# Patient Record
Sex: MALE | Race: WHITE | Employment: FULL TIME | URBAN - METROPOLITAN AREA
[De-identification: names, ages, dates, MRNs, and addresses within clinical notes are randomized per-mention and may not be internally consistent; named-entity substitution may affect disease eponyms.]

---

## 2017-04-24 ENCOUNTER — COMMUNICATION - HEALTHEAST (OUTPATIENT)
Dept: INTERNAL MEDICINE | Facility: CLINIC | Age: 37
End: 2017-04-24

## 2017-04-26 ENCOUNTER — COMMUNICATION - HEALTHEAST (OUTPATIENT)
Dept: INTERNAL MEDICINE | Facility: CLINIC | Age: 37
End: 2017-04-26

## 2017-05-09 ENCOUNTER — RECORDS - HEALTHEAST (OUTPATIENT)
Dept: ADMINISTRATIVE | Facility: OTHER | Age: 37
End: 2017-05-09

## 2017-05-18 ENCOUNTER — COMMUNICATION - HEALTHEAST (OUTPATIENT)
Dept: INTERNAL MEDICINE | Facility: CLINIC | Age: 37
End: 2017-05-18

## 2017-06-14 ENCOUNTER — COMMUNICATION - HEALTHEAST (OUTPATIENT)
Dept: INTERNAL MEDICINE | Facility: CLINIC | Age: 37
End: 2017-06-14

## 2017-06-14 DIAGNOSIS — E11.9 DIABETES (H): ICD-10-CM

## 2017-07-10 ENCOUNTER — COMMUNICATION - HEALTHEAST (OUTPATIENT)
Dept: INTERNAL MEDICINE | Facility: CLINIC | Age: 37
End: 2017-07-10

## 2017-08-06 ENCOUNTER — COMMUNICATION - HEALTHEAST (OUTPATIENT)
Dept: INTERNAL MEDICINE | Facility: CLINIC | Age: 37
End: 2017-08-06

## 2017-09-04 ENCOUNTER — COMMUNICATION - HEALTHEAST (OUTPATIENT)
Dept: INTERNAL MEDICINE | Facility: CLINIC | Age: 37
End: 2017-09-04

## 2017-10-04 ENCOUNTER — COMMUNICATION - HEALTHEAST (OUTPATIENT)
Dept: INTERNAL MEDICINE | Facility: CLINIC | Age: 37
End: 2017-10-04

## 2017-10-30 ENCOUNTER — COMMUNICATION - HEALTHEAST (OUTPATIENT)
Dept: INTERNAL MEDICINE | Facility: CLINIC | Age: 37
End: 2017-10-30

## 2018-04-12 ENCOUNTER — COMMUNICATION - HEALTHEAST (OUTPATIENT)
Dept: INTERNAL MEDICINE | Facility: CLINIC | Age: 38
End: 2018-04-12

## 2018-04-12 DIAGNOSIS — E11.9 DIABETES (H): ICD-10-CM

## 2018-04-15 ENCOUNTER — COMMUNICATION - HEALTHEAST (OUTPATIENT)
Dept: SCHEDULING | Facility: CLINIC | Age: 38
End: 2018-04-15

## 2018-04-16 ENCOUNTER — COMMUNICATION - HEALTHEAST (OUTPATIENT)
Dept: INTERNAL MEDICINE | Facility: CLINIC | Age: 38
End: 2018-04-16

## 2018-04-16 DIAGNOSIS — E11.9 DIABETES (H): ICD-10-CM

## 2018-05-09 ENCOUNTER — OFFICE VISIT - HEALTHEAST (OUTPATIENT)
Dept: INTERNAL MEDICINE | Facility: CLINIC | Age: 38
End: 2018-05-09

## 2018-05-09 DIAGNOSIS — M81.0 OSTEOPOROSIS: ICD-10-CM

## 2018-05-09 DIAGNOSIS — F33.0 MILD EPISODE OF RECURRENT MAJOR DEPRESSIVE DISORDER (H): ICD-10-CM

## 2018-05-09 DIAGNOSIS — E10.9 TYPE 1 DIABETES MELLITUS WITHOUT COMPLICATION (H): ICD-10-CM

## 2018-05-09 LAB
ALBUMIN SERPL-MCNC: 3.9 G/DL (ref 3.5–5)
ALP SERPL-CCNC: 120 U/L (ref 45–120)
ALT SERPL W P-5'-P-CCNC: 32 U/L (ref 0–45)
ANION GAP SERPL CALCULATED.3IONS-SCNC: 10 MMOL/L (ref 5–18)
AST SERPL W P-5'-P-CCNC: 15 U/L (ref 0–40)
BILIRUB SERPL-MCNC: 0.5 MG/DL (ref 0–1)
BUN SERPL-MCNC: 13 MG/DL (ref 8–22)
CALCIUM SERPL-MCNC: 9.5 MG/DL (ref 8.5–10.5)
CHLORIDE BLD-SCNC: 104 MMOL/L (ref 98–107)
CHOLEST SERPL-MCNC: 139 MG/DL
CO2 SERPL-SCNC: 25 MMOL/L (ref 22–31)
CREAT SERPL-MCNC: 0.87 MG/DL (ref 0.7–1.3)
FASTING STATUS PATIENT QL REPORTED: YES
GFR SERPL CREATININE-BSD FRML MDRD: >60 ML/MIN/1.73M2
GLUCOSE BLD-MCNC: 278 MG/DL (ref 70–125)
HBA1C MFR BLD: 13.6 % (ref 3.5–6)
HDLC SERPL-MCNC: 38 MG/DL
LDLC SERPL CALC-MCNC: 89 MG/DL
POTASSIUM BLD-SCNC: 4.8 MMOL/L (ref 3.5–5)
PROT SERPL-MCNC: 7.3 G/DL (ref 6–8)
SODIUM SERPL-SCNC: 139 MMOL/L (ref 136–145)
TRIGL SERPL-MCNC: 58 MG/DL
TSH SERPL DL<=0.005 MIU/L-ACNC: 1.61 UIU/ML (ref 0.3–5)

## 2018-05-09 ASSESSMENT — MIFFLIN-ST. JEOR: SCORE: 1648.35

## 2018-05-10 ENCOUNTER — COMMUNICATION - HEALTHEAST (OUTPATIENT)
Dept: INTERNAL MEDICINE | Facility: CLINIC | Age: 38
End: 2018-05-10

## 2018-05-10 DIAGNOSIS — E11.9 DIABETES (H): ICD-10-CM

## 2018-05-10 LAB — 25(OH)D3 SERPL-MCNC: 15.8 NG/ML (ref 30–80)

## 2018-05-12 LAB
TESTOST SERPL-MCNC: 201 NG/DL (ref 240–950)
TESTOSTERONE, FREE, S - HISTORICAL: 5.43 NG/DL (ref 4.65–18.1)

## 2018-05-30 ENCOUNTER — COMMUNICATION - HEALTHEAST (OUTPATIENT)
Dept: INTERNAL MEDICINE | Facility: CLINIC | Age: 38
End: 2018-05-30

## 2018-06-01 ENCOUNTER — COMMUNICATION - HEALTHEAST (OUTPATIENT)
Dept: INTERNAL MEDICINE | Facility: CLINIC | Age: 38
End: 2018-06-01

## 2018-06-08 ENCOUNTER — RECORDS - HEALTHEAST (OUTPATIENT)
Dept: ADMINISTRATIVE | Facility: OTHER | Age: 38
End: 2018-06-08

## 2018-06-26 ENCOUNTER — COMMUNICATION - HEALTHEAST (OUTPATIENT)
Dept: INTERNAL MEDICINE | Facility: CLINIC | Age: 38
End: 2018-06-26

## 2018-06-26 DIAGNOSIS — E11.9 DIABETES (H): ICD-10-CM

## 2018-06-26 DIAGNOSIS — E10.9 TYPE 1 DIABETES MELLITUS (H): ICD-10-CM

## 2018-08-01 ENCOUNTER — COMMUNICATION - HEALTHEAST (OUTPATIENT)
Dept: INTERNAL MEDICINE | Facility: CLINIC | Age: 38
End: 2018-08-01

## 2018-08-16 ENCOUNTER — COMMUNICATION - HEALTHEAST (OUTPATIENT)
Dept: INTERNAL MEDICINE | Facility: CLINIC | Age: 38
End: 2018-08-16

## 2018-08-16 DIAGNOSIS — E11.9 DIABETES (H): ICD-10-CM

## 2018-08-27 ENCOUNTER — RECORDS - HEALTHEAST (OUTPATIENT)
Dept: ADMINISTRATIVE | Facility: OTHER | Age: 38
End: 2018-08-27

## 2018-08-30 ENCOUNTER — COMMUNICATION - HEALTHEAST (OUTPATIENT)
Dept: INTERNAL MEDICINE | Facility: CLINIC | Age: 38
End: 2018-08-30

## 2018-09-27 ENCOUNTER — COMMUNICATION - HEALTHEAST (OUTPATIENT)
Dept: INTERNAL MEDICINE | Facility: CLINIC | Age: 38
End: 2018-09-27

## 2018-09-27 DIAGNOSIS — E11.9 DIABETES (H): ICD-10-CM

## 2018-11-01 ENCOUNTER — COMMUNICATION - HEALTHEAST (OUTPATIENT)
Dept: INTERNAL MEDICINE | Facility: CLINIC | Age: 38
End: 2018-11-01

## 2018-11-15 ENCOUNTER — COMMUNICATION - HEALTHEAST (OUTPATIENT)
Dept: INTERNAL MEDICINE | Facility: CLINIC | Age: 38
End: 2018-11-15

## 2018-11-15 DIAGNOSIS — E11.9 DIABETES (H): ICD-10-CM

## 2018-11-28 ENCOUNTER — COMMUNICATION - HEALTHEAST (OUTPATIENT)
Dept: INTERNAL MEDICINE | Facility: CLINIC | Age: 38
End: 2018-11-28

## 2018-12-19 ENCOUNTER — COMMUNICATION - HEALTHEAST (OUTPATIENT)
Dept: INTERNAL MEDICINE | Facility: CLINIC | Age: 38
End: 2018-12-19

## 2018-12-19 DIAGNOSIS — E11.9 DIABETES (H): ICD-10-CM

## 2019-01-21 ENCOUNTER — COMMUNICATION - HEALTHEAST (OUTPATIENT)
Dept: INTERNAL MEDICINE | Facility: CLINIC | Age: 39
End: 2019-01-21

## 2019-01-23 ENCOUNTER — COMMUNICATION - HEALTHEAST (OUTPATIENT)
Dept: INTERNAL MEDICINE | Facility: CLINIC | Age: 39
End: 2019-01-23

## 2019-01-24 ENCOUNTER — COMMUNICATION - HEALTHEAST (OUTPATIENT)
Dept: INTERNAL MEDICINE | Facility: CLINIC | Age: 39
End: 2019-01-24

## 2019-01-24 DIAGNOSIS — E11.9 DIABETES (H): ICD-10-CM

## 2019-01-25 ENCOUNTER — COMMUNICATION - HEALTHEAST (OUTPATIENT)
Dept: INTERNAL MEDICINE | Facility: CLINIC | Age: 39
End: 2019-01-25

## 2019-01-25 DIAGNOSIS — E11.9 DIABETES (H): ICD-10-CM

## 2019-02-19 ENCOUNTER — COMMUNICATION - HEALTHEAST (OUTPATIENT)
Dept: INTERNAL MEDICINE | Facility: CLINIC | Age: 39
End: 2019-02-19

## 2019-02-19 DIAGNOSIS — E11.9 DIABETES (H): ICD-10-CM

## 2019-03-04 ENCOUNTER — HOSPITAL ENCOUNTER (EMERGENCY)
Facility: CLINIC | Age: 39
Discharge: HOME | End: 2019-03-04
Attending: EMERGENCY MEDICINE | Admitting: EMERGENCY MEDICINE
Payer: COMMERCIAL

## 2019-03-04 ENCOUNTER — RECORDS - HEALTHEAST (OUTPATIENT)
Dept: ADMINISTRATIVE | Facility: OTHER | Age: 39
End: 2019-03-04

## 2019-03-04 ENCOUNTER — APPOINTMENT (OUTPATIENT)
Dept: GENERAL RADIOLOGY | Facility: CLINIC | Age: 39
End: 2019-03-04
Attending: EMERGENCY MEDICINE
Payer: COMMERCIAL

## 2019-03-04 VITALS
OXYGEN SATURATION: 99 % | TEMPERATURE: 98.4 F | BODY MASS INDEX: 23.62 KG/M2 | DIASTOLIC BLOOD PRESSURE: 92 MMHG | SYSTOLIC BLOOD PRESSURE: 138 MMHG | HEART RATE: 118 BPM | WEIGHT: 165 LBS | HEIGHT: 70 IN | RESPIRATION RATE: 23 BRPM

## 2019-03-04 DIAGNOSIS — R73.9 HYPERGLYCEMIA: ICD-10-CM

## 2019-03-04 DIAGNOSIS — J10.1 INFLUENZA A: ICD-10-CM

## 2019-03-04 DIAGNOSIS — E86.0 DEHYDRATION: ICD-10-CM

## 2019-03-04 LAB
ALBUMIN SERPL-MCNC: 3.5 G/DL (ref 3.4–5)
ALBUMIN UR-MCNC: 30 MG/DL
ALP SERPL-CCNC: 109 U/L (ref 40–150)
ALT SERPL W P-5'-P-CCNC: 43 U/L (ref 0–70)
ANION GAP SERPL CALCULATED.3IONS-SCNC: 12 MMOL/L (ref 3–14)
APPEARANCE UR: CLEAR
AST SERPL W P-5'-P-CCNC: 20 U/L (ref 0–45)
BASOPHILS # BLD AUTO: 0 10E9/L (ref 0–0.2)
BASOPHILS NFR BLD AUTO: 0.1 %
BILIRUB SERPL-MCNC: 0.6 MG/DL (ref 0.2–1.3)
BILIRUB UR QL STRIP: NEGATIVE
BUN SERPL-MCNC: 40 MG/DL (ref 7–30)
CALCIUM SERPL-MCNC: 8.6 MG/DL (ref 8.5–10.1)
CHLORIDE SERPL-SCNC: 89 MMOL/L (ref 94–109)
CO2 BLDCOV-SCNC: 31 MMOL/L (ref 21–28)
CO2 SERPL-SCNC: 30 MMOL/L (ref 20–32)
COLOR UR AUTO: YELLOW
CREAT SERPL-MCNC: 0.94 MG/DL (ref 0.66–1.25)
DIFFERENTIAL METHOD BLD: ABNORMAL
EOSINOPHIL # BLD AUTO: 0 10E9/L (ref 0–0.7)
EOSINOPHIL NFR BLD AUTO: 0 %
ERYTHROCYTE [DISTWIDTH] IN BLOOD BY AUTOMATED COUNT: 12.3 % (ref 10–15)
FLUAV+FLUBV AG SPEC QL: NEGATIVE
FLUAV+FLUBV AG SPEC QL: POSITIVE
GFR SERPL CREATININE-BSD FRML MDRD: >90 ML/MIN/{1.73_M2}
GLUCOSE BLDC GLUCOMTR-MCNC: 198 MG/DL (ref 70–99)
GLUCOSE BLDC GLUCOMTR-MCNC: 277 MG/DL (ref 70–99)
GLUCOSE SERPL-MCNC: 289 MG/DL (ref 70–99)
GLUCOSE UR STRIP-MCNC: >499 MG/DL
HCT VFR BLD AUTO: 46.3 % (ref 40–53)
HGB BLD-MCNC: 16 G/DL (ref 13.3–17.7)
HGB UR QL STRIP: ABNORMAL
IMM GRANULOCYTES # BLD: 0.1 10E9/L (ref 0–0.4)
IMM GRANULOCYTES NFR BLD: 0.3 %
INTERPRETATION ECG - MUSE: NORMAL
KETONES BLD-SCNC: 4.8 MMOL/L (ref 0–0.6)
KETONES UR STRIP-MCNC: 80 MG/DL
LACTATE BLD-SCNC: 2.1 MMOL/L (ref 0.7–2.1)
LEUKOCYTE ESTERASE UR QL STRIP: NEGATIVE
LIPASE SERPL-CCNC: 32 U/L (ref 73–393)
LYMPHOCYTES # BLD AUTO: 1.1 10E9/L (ref 0.8–5.3)
LYMPHOCYTES NFR BLD AUTO: 7.3 %
MCH RBC QN AUTO: 28.9 PG (ref 26.5–33)
MCHC RBC AUTO-ENTMCNC: 34.6 G/DL (ref 31.5–36.5)
MCV RBC AUTO: 84 FL (ref 78–100)
MONOCYTES # BLD AUTO: 1.1 10E9/L (ref 0–1.3)
MONOCYTES NFR BLD AUTO: 7.5 %
MUCOUS THREADS #/AREA URNS LPF: PRESENT /LPF
NEUTROPHILS # BLD AUTO: 12.8 10E9/L (ref 1.6–8.3)
NEUTROPHILS NFR BLD AUTO: 84.8 %
NITRATE UR QL: NEGATIVE
NRBC # BLD AUTO: 0 10*3/UL
NRBC BLD AUTO-RTO: 0 /100
PCO2 BLDV: 44 MM HG (ref 40–50)
PH BLDV: 7.45 PH (ref 7.32–7.43)
PH UR STRIP: 5 PH (ref 5–7)
PLATELET # BLD AUTO: 243 10E9/L (ref 150–450)
PO2 BLDV: 31 MM HG (ref 25–47)
POTASSIUM SERPL-SCNC: 3.7 MMOL/L (ref 3.4–5.3)
PROT SERPL-MCNC: 8.2 G/DL (ref 6.8–8.8)
RBC # BLD AUTO: 5.53 10E12/L (ref 4.4–5.9)
RBC #/AREA URNS AUTO: <1 /HPF (ref 0–2)
SAO2 % BLDV FROM PO2: 62 %
SODIUM SERPL-SCNC: 131 MMOL/L (ref 133–144)
SOURCE: ABNORMAL
SP GR UR STRIP: 1.03 (ref 1–1.03)
SPECIMEN SOURCE: ABNORMAL
TROPONIN I SERPL-MCNC: <0.015 UG/L (ref 0–0.04)
UROBILINOGEN UR STRIP-MCNC: 0 MG/DL (ref 0–2)
WBC # BLD AUTO: 15.1 10E9/L (ref 4–11)
WBC #/AREA URNS AUTO: 1 /HPF (ref 0–5)

## 2019-03-04 PROCEDURE — 93005 ELECTROCARDIOGRAM TRACING: CPT

## 2019-03-04 PROCEDURE — 96361 HYDRATE IV INFUSION ADD-ON: CPT

## 2019-03-04 PROCEDURE — 00000146 ZZHCL STATISTIC GLUCOSE BY METER IP

## 2019-03-04 PROCEDURE — 82803 BLOOD GASES ANY COMBINATION: CPT

## 2019-03-04 PROCEDURE — 84484 ASSAY OF TROPONIN QUANT: CPT | Performed by: EMERGENCY MEDICINE

## 2019-03-04 PROCEDURE — 25000128 H RX IP 250 OP 636: Performed by: EMERGENCY MEDICINE

## 2019-03-04 PROCEDURE — 82010 KETONE BODYS QUAN: CPT | Performed by: EMERGENCY MEDICINE

## 2019-03-04 PROCEDURE — 25000131 ZZH RX MED GY IP 250 OP 636 PS 637: Performed by: EMERGENCY MEDICINE

## 2019-03-04 PROCEDURE — 83690 ASSAY OF LIPASE: CPT | Performed by: EMERGENCY MEDICINE

## 2019-03-04 PROCEDURE — 99285 EMERGENCY DEPT VISIT HI MDM: CPT | Mod: 25

## 2019-03-04 PROCEDURE — 85025 COMPLETE CBC W/AUTO DIFF WBC: CPT | Performed by: EMERGENCY MEDICINE

## 2019-03-04 PROCEDURE — 80053 COMPREHEN METABOLIC PANEL: CPT | Performed by: EMERGENCY MEDICINE

## 2019-03-04 PROCEDURE — 96374 THER/PROPH/DIAG INJ IV PUSH: CPT

## 2019-03-04 PROCEDURE — 83605 ASSAY OF LACTIC ACID: CPT

## 2019-03-04 PROCEDURE — 87804 INFLUENZA ASSAY W/OPTIC: CPT | Mod: 91 | Performed by: EMERGENCY MEDICINE

## 2019-03-04 PROCEDURE — 71046 X-RAY EXAM CHEST 2 VIEWS: CPT

## 2019-03-04 PROCEDURE — 81001 URINALYSIS AUTO W/SCOPE: CPT | Performed by: EMERGENCY MEDICINE

## 2019-03-04 RX ORDER — ONDANSETRON 4 MG/1
4 TABLET, ORALLY DISINTEGRATING ORAL ONCE
Status: COMPLETED | OUTPATIENT
Start: 2019-03-04 | End: 2019-03-04

## 2019-03-04 RX ORDER — ONDANSETRON 2 MG/ML
4 INJECTION INTRAMUSCULAR; INTRAVENOUS ONCE
Status: COMPLETED | OUTPATIENT
Start: 2019-03-04 | End: 2019-03-04

## 2019-03-04 RX ORDER — ONDANSETRON 4 MG/1
4 TABLET, ORALLY DISINTEGRATING ORAL EVERY 8 HOURS PRN
Qty: 10 TABLET | Refills: 0 | Status: ON HOLD | OUTPATIENT
Start: 2019-03-04 | End: 2019-08-07

## 2019-03-04 RX ADMIN — SODIUM CHLORIDE 1000 ML: 9 INJECTION, SOLUTION INTRAVENOUS at 11:25

## 2019-03-04 RX ADMIN — ONDANSETRON 4 MG: 4 TABLET, ORALLY DISINTEGRATING ORAL at 13:42

## 2019-03-04 RX ADMIN — ONDANSETRON 4 MG: 2 INJECTION INTRAMUSCULAR; INTRAVENOUS at 11:25

## 2019-03-04 ASSESSMENT — ENCOUNTER SYMPTOMS
DIARRHEA: 0
COUGH: 1
WEAKNESS: 1
WOUND: 1
NAUSEA: 1
VOMITING: 1
DYSURIA: 0
CHEST TIGHTNESS: 1

## 2019-03-04 ASSESSMENT — MIFFLIN-ST. JEOR: SCORE: 1669.69

## 2019-03-04 NOTE — ED TRIAGE NOTES
Patient arrives here for evaluation of generalized weakness and chest pressure. He notes this started on Friday with cough, progressed to vomiting, weakness, body aches. Denies headache, neck pain, or diarrhea. Notes he is insulin dependent diabetic and sugars have been in double digits. Last sugar was 80's. AOX4.

## 2019-03-04 NOTE — ED AVS SNAPSHOT
[facility] Emergency Department  [address]  [facility] [address]  Phone:  [phone]  Fax:  [phone]                                    Sage Luna   MRN: [id number]    Department:  [facility] Emergency Department   Date of Visit:  3/4/2019           After Visit Summary Signature Page    I have received my discharge instructions, and my questions have been answered. I have discussed any challenges I see with this plan with the nurse or doctor.    ..........................................................................................................................................  Patient/Patient Representative Signature      ..........................................................................................................................................  Patient Representative Print Name and Relationship to Patient    ..................................................               ................................................  Date                                   Time    ..........................................................................................................................................  Reviewed by Signature/Title    ...................................................              ..............................................  Date                                               Time          22EPIC Rev 08/18

## 2019-03-04 NOTE — ED PROVIDER NOTES
History     Chief Complaint:  Weakness     HPI:   The history is provided by the patient.      Sage Luna is a 38 year old male with a history of type I diabetes mellitus who presents to the emergency department with his sister for evaluation of weakness. The patient reports that he has developed a cough, diffuse weakness, nausea, and nonbloody, nonbilious vomiting over the weekend (2 days ago). He has also had a low grade fever, chest chest tightness, and a sensation that food is getting stuck in his lower throat. He expresses that his wife and kids have been sick at home as well with similar symptoms though all are improving. He presents to the emergency department for evaluation. Ibuprofen has not been helpful. Here, the patient also expresses that his sugars have been low and he has left anterior shin wounds that have been present for quite some time. He denies any dysuria, diarrhea, recent antibiotics or hospitalization.      CARDIAC RISK FACTORS:  Sex:    Male   Tobacco:   Negative   Hypertension:   Negative   Hyperlipidemia:  Negative   Diabetes:   Positive   Family History:  Negative     Allergies:  No known drug allergies     Medications:    Novolog   Lantus   Senna      Past Medical History:    Type I diabetes mellitus   Lyme disease   Vitamin D deficiency   Anemia   Lyme arthritis     Past Surgical History:    Arthroscopy knee right   Arthroscopy knee with lateral meniscectomy   ORIF femur distal right   Remove hardware knee right     Family History:    Father - hypertension     Social History:  Presents with sister    Tobacco use: Never smoker   Alcohol use: Seldom   PCP: Que Wu    Marital Status:       Review of Systems   Respiratory: Positive for cough and chest tightness.    Gastrointestinal: Positive for nausea and vomiting. Negative for diarrhea.   Genitourinary: Negative for dysuria.   Skin: Positive for wound.   Neurological: Positive for weakness.   All other systems  "reviewed and are negative.      Physical Exam     Patient Vitals for the past 24 hrs:   BP Temp Temp src Pulse Heart Rate Resp SpO2 Height Weight   03/04/19 1320 -- -- -- -- -- -- 99 % -- --   03/04/19 1300 (!) 138/92 -- -- 118 -- -- 100 % -- --   03/04/19 1215 (!) 146/96 -- -- 118 116 23 100 % -- --   03/04/19 1200 (!) 134/100 -- -- 107 105 12 98 % -- --   03/04/19 1145 (!) 139/97 -- -- 114 116 14 99 % -- --   03/04/19 1130 (!) 142/97 -- -- 112 117 -- 100 % -- --   03/04/19 1115 (!) 136/98 -- -- 116 112 14 98 % -- --   03/04/19 1047 (!) 150/104 98.4  F (36.9  C) Oral 112 -- 18 100 % 1.778 m (5' 10\") 74.8 kg (165 lb)        Physical Exam  Nursing note and vitals reviewed.  Constitutional: Well nourished. Resting comfortably.   Eyes: Conjunctiva normal.  Pupils are equal, round, and reactive to light.   ENT: Nose normal. Mucous membranes pink and try  Neck: Normal range of motion.  CVS: Sinus tachycardia.  Normal heart sounds.  No murmur.  Pulmonary: Lungs clear to auscultation bilaterally. No wheezes/rales/rhonchi.  GI: Abdomen soft. Nontender, nondistended. No rigidity or guarding.    MSK: No calf tenderness or swelling. L. Anterior chin with older appearing abrasion-like wounds. No surrounding warmth/erythema/induration.   Neuro: Alert. Follows simple commands.  Skin: Skin is warm and dry. No rash noted.   Psychiatric: Blunt affect.       Emergency Department Course   ECG (11:06:50):  Rate 105 bpm. MT interval 136. QRS duration 86. QT/QTc 330/436. P-R-T axes 73 -6 46. Sinus tachycardia. Otherwise normal ECG. Agree with computer interpretation.  Interpreted at 1106 by Corine Vides DO.     Imaging:  Radiographic findings were communicated with the patient and family who voiced understanding of the findings.     XR Chest, 2 views:  IMPRESSION: Lungs are hyperinflated and clear. No evidence of  consolidation. No evidence of pleural effusion. Heart size is  unchanged. T11 compression fracture is noted, likely " chronic. PICC has  been removed.    Imaging independently reviewed and agree with radiologist interpretation.     Laboratory:  I personally reviewed the laboratory results with the Patient and sister and answered all related questions prior to discharge.     CBC: WBC 15.1 (H), ow WNL (HGB 16.0, )    CMP: Sodium 131 (L), Chloride 89 (L), Glucose 289 (H), BUN 40 (H), ow WNL (Creatinine 0.94)   1117: Troponin I: <0.015   Lipase: 32 (L)    Ketone beta-hydroxybutyrate Quant: 4.8 (HH)  Influenza A/B antigen: Positive for A    1109 Glucose by meter: 277 (H)    ISTAT gases lactate jeremias POCT: pH: 7.45 (H), PCO2: 44, PO2: 31, Bicarbonate: 31 (H), O2 Sat: 62, Lactic acid: 2.1     Glucose by meter after 1L IVF  1300: 198    Interventions:  1125: NS 1L IV Bolus    1125: Zofran 4 mg IV   1255: NS 1L IV Bolus    Emergency Department Course:  Past medical records, nursing notes, and vitals reviewed.  1059: I performed an exam of the patient and obtained history, as documented above.    IV inserted and blood drawn. This was sent to the lab for further testing, results above.   Above interventions provided.      The patient was sent for a XR while in the emergency department, findings above.   1145: Patient resting, requesting ice chips.     1324: I rechecked the patient. Tolerated PO. Findings and plan explained to the Patient. Patient discharged home with instructions regarding supportive care, medications, and reasons to return. The importance of close follow-up was reviewed.       Impression & Plan       CMS Diagnoses: The Lactic acid level is elevated due to mild dehydration, at this time there is no sign of severe sepsis or septic shock.    Medical Decision Making:  Sage Luna is a 38 year old male with history of diabetes presenting with URI stigmata and weakness. He is tachycardic on arrival, though has a nonfocal exam. His screening EKG was without focal ischemia.  He complained of chest tightness though  screening troponin unremarkable and his presentation would be atypical of ACS.  Patient is influenza positive today. He has a notable leukocytosis and I suspect this is more reactive in the setting of vomiting. He was also hyperglycemic though no evidence of DKA. The patient received 2L of IV fluids during his time int he emergency department with down trending glucose. Chest XR is without focal pneumonia or acute process.  UA without gross infection.  The patient has noted L. Medina wounds though no clinical signs of overlying cellulitis. On reevaluation after IV fluids and anti-emetics the patient is tolerating PO.  His HR is still mildly tachycardiac though clinically he looks much improved and is requesting outpatient management.  I discussed with patient my recommendation for a BRAT diet and PO hydration. He will be sent home with ODT zofran.  No indication for Tamiflu at this point in time, particularly as symptoms have been ongoing for a few days. I recommended close PCP follow-up in the next 2-3 days. I also discussed with patient close monitoring of his sugars as viral infections can worsen glucose control. Return precautions given.     Diagnosis:    ICD-10-CM    1. Influenza A J10.1 UA with Microscopic     Glucose by meter     Glucose by meter   2. Dehydration E86.0    3. Hyperglycemia R73.9        Disposition:  Discharged to home with plan as outlined.     Discharge Medications:     Medication List      Started    ondansetron 4 MG ODT tab  Commonly known as:  ZOFRAN ODT  4 mg, Oral, EVERY 8 HOURS PRN          Scribe Disclosure:   Finesse EM, am serving as a scribe at 10:59 AM on 3/4/2019 to document services personally performed by Corine Vides DO based on my observations and the provider's statements to me.       Corine Vides DO  3/4/2019   [facility] EMERGENCY DEPARTMENT       Corine Vides DO  03/04/19 7280

## 2019-03-15 ENCOUNTER — ANCILLARY PROCEDURE (OUTPATIENT)
Dept: GENERAL RADIOLOGY | Facility: CLINIC | Age: 39
End: 2019-03-15
Attending: PHYSICIAN ASSISTANT
Payer: COMMERCIAL

## 2019-03-15 ENCOUNTER — OFFICE VISIT (OUTPATIENT)
Dept: PEDIATRICS | Facility: CLINIC | Age: 39
End: 2019-03-15
Payer: COMMERCIAL

## 2019-03-15 VITALS
SYSTOLIC BLOOD PRESSURE: 120 MMHG | OXYGEN SATURATION: 98 % | BODY MASS INDEX: 24.17 KG/M2 | WEIGHT: 168.8 LBS | HEIGHT: 70 IN | TEMPERATURE: 98.6 F | HEART RATE: 111 BPM | DIASTOLIC BLOOD PRESSURE: 76 MMHG

## 2019-03-15 DIAGNOSIS — J01.80 ACUTE NON-RECURRENT SINUSITIS OF OTHER SINUS: Primary | ICD-10-CM

## 2019-03-15 DIAGNOSIS — R05.9 COUGH: ICD-10-CM

## 2019-03-15 PROCEDURE — 99203 OFFICE O/P NEW LOW 30 MIN: CPT | Performed by: PHYSICIAN ASSISTANT

## 2019-03-15 PROCEDURE — 71046 X-RAY EXAM CHEST 2 VIEWS: CPT

## 2019-03-15 ASSESSMENT — MIFFLIN-ST. JEOR: SCORE: 1686.92

## 2019-03-15 NOTE — PROGRESS NOTES
"  SUBJECTIVE:   Sage Luna is a 38 year old male who presents to clinic today for the following health issues:    Acute Illness   Acute illness concerns: cough  Onset: 15 days    Fever: no    Chills/Sweats: YES- chills    Headache (location?): no    Sinus Pressure:YES- post-nasal drainage    Conjunctivitis:  no    Ear Pain: no    Rhinorrhea: YES- clear    Congestion: YES- chest    Sore Throat: YES- in morning     Cough: YES-productive of clear sputum    Wheeze: YES- slight    Decreased Appetite: no    Nausea: YES    Vomiting: YES- some    Diarrhea:  no    Dysuria/Freq.: no    Fatigue/Achiness: YES- both    Sick/Strep Exposure: YES- family     Therapies Tried and outcome: tylenol    Influenza A on 3/4/19--no tamiflu.     DM type I--controlled.     ROS:  ROS otherwise negative    OBJECTIVE:                                                    /76 (BP Location: Right arm, Patient Position: Sitting, Cuff Size: Adult Regular)   Pulse 111   Temp 98.6  F (37  C) (Tympanic)   Ht 1.778 m (5' 10\")   Wt 76.6 kg (168 lb 12.8 oz)   SpO2 98%   BMI 24.22 kg/m    Body mass index is 24.22 kg/m .   GENERAL: alert, no distress  HENT: ear canals- normal; TMs- normal; Nose- normal; Mouth- no ulcers, no lesions; max sinus tenderness  NECK: tonsillar LAD  RESP: diminished breath sounds - no rales, no rhonchi, no wheezes  CV: regular rates and rhythm, normal S1 S2, no S3 or S4 and no murmur, no click or rub   Diagnostic test results:  CXR appears NEGATIVE.   No results found for this or any previous visit (from the past 24 hour(s)).     ASSESSMENT/PLAN:                                                    (J01.80) Acute non-recurrent sinusitis of other sinus  (primary encounter diagnosis)  Comment: with recent influenza A and immunosuppression, begin antibiotics for sinusitis.   Plan: amoxicillin-clavulanate (AUGMENTIN) 875-125 MG         tablet            (R05) Cough  Comment:   Plan: XR Chest 2 Views            Nakia " Agatha Reyes PA-C  [facility]

## 2019-06-10 ENCOUNTER — RECORDS - HEALTHEAST (OUTPATIENT)
Dept: ADMINISTRATIVE | Facility: OTHER | Age: 39
End: 2019-06-10

## 2019-06-10 ENCOUNTER — HOSPITAL ENCOUNTER (EMERGENCY)
Facility: CLINIC | Age: 39
Discharge: HOME | End: 2019-06-10
Attending: EMERGENCY MEDICINE | Admitting: EMERGENCY MEDICINE
Payer: COMMERCIAL

## 2019-06-10 VITALS
DIASTOLIC BLOOD PRESSURE: 88 MMHG | RESPIRATION RATE: 16 BRPM | HEART RATE: 109 BPM | OXYGEN SATURATION: 100 % | TEMPERATURE: 97.4 F | SYSTOLIC BLOOD PRESSURE: 134 MMHG

## 2019-06-10 DIAGNOSIS — R22.0 FACIAL SWELLING: ICD-10-CM

## 2019-06-10 DIAGNOSIS — K08.89 PAIN, DENTAL: ICD-10-CM

## 2019-06-10 PROCEDURE — 99282 EMERGENCY DEPT VISIT SF MDM: CPT

## 2019-06-10 RX ORDER — HYDROCODONE BITARTRATE AND ACETAMINOPHEN 5; 325 MG/1; MG/1
1 TABLET ORAL EVERY 6 HOURS PRN
Qty: 10 TABLET | Refills: 0 | Status: ON HOLD | OUTPATIENT
Start: 2019-06-10 | End: 2019-08-07

## 2019-06-10 ASSESSMENT — ENCOUNTER SYMPTOMS
HEADACHES: 1
FEVER: 0

## 2019-06-10 NOTE — ED AVS SNAPSHOT
[facility] Emergency Department  [address]  [facility] [address]  Phone:  [phone]  Fax:  [phone]                                    Sage Luna   MRN: [id number]    Department:  [facility] Emergency Department   Date of Visit:  6/10/2019           After Visit Summary Signature Page    I have received my discharge instructions, and my questions have been answered. I have discussed any challenges I see with this plan with the nurse or doctor.    ..........................................................................................................................................  Patient/Patient Representative Signature      ..........................................................................................................................................  Patient Representative Print Name and Relationship to Patient    ..................................................               ................................................  Date                                   Time    ..........................................................................................................................................  Reviewed by Signature/Title    ...................................................              ..............................................  Date                                               Time          22EPIC Rev 08/18

## 2019-06-11 NOTE — ED PROVIDER NOTES
"  History     Chief Complaint:  Facial Pain     HPI   Sage Luna is a 38 year old male with a history of Type 1 diabetes who presents with left sided facial and jaw pain. The patient notes that the pain started a day or two ago, and has been intermittent since then. Currently it seems to be persistent pain which is why he presents to the ED today. Here, the patient reports that the pain is at the top and bottom of the jaw line, and sometimes experiences headache and ear pain. He states that it is painful to heat and drink, and can be sensitive to hot and cold. The patient denies any fever, rashes, or contact with anyone with mumps.     Allergies:  NKDA     Medications:    Tylenol  Augmentin  Insulin  Senokot-S    Past Medical History:    Diabetes  Lyme Disease    Past Surgical History:    Arthroscopy Knee  Arthroscopy Knee with lateral meniscectomy  Open reduction internal fixation femur distal  Remove hardware knee    Family History:    HTN    Social History:  Negative for tobacco use.  Positive for alcohol use, \"seldom\"  Marital Status:   [2]       Review of Systems   Constitutional: Negative for fever.   HENT: Positive for dental problem and ear discharge.    Skin: Negative for rash.   Neurological: Positive for headaches.   All other systems reviewed and are negative.      Physical Exam     Patient Vitals for the past 24 hrs:   BP Temp Pulse Heart Rate Resp SpO2   06/10/19 2057 134/88 97.4  F (36.3  C) 109 109 16 100 %       Physical Exam  General: Patient is alert and interactive when I enter the room  Head:  The scalp, face, and head appear normal  Eyes:  Conjunctivae are normal  ENT:    The nose is normal    Pinnae are normal    External acoustic canals are normal. TMs normal bilaterally.     TMJs are without crepitus/clicking bilaterally.     Dental inspection shows caries, no periapical abscess. Pain to palpation over left upper canine.     Floor of mouth is soft.  No peritonsillar abscess. No " uvulitis or swelling.    Neck:  Trachea midline  CV:  Pulses are normal.   Resp:  No respiratory distress   Musc:  Normal muscular tone    No major joint effusions    No asymmetric leg swelling    Good capillary refill noted  Skin:  No rash or lesions noted. No facial swelling or erythema.  Neuro:  Speech is normal and fluent. Face is symmetric.     Moving all extremities well.  Facial and trigeminal nerve are tested and intact.   Psych: Awake. Alert.  Normal affect.  Appropriate interactions.        Emergency Department Course   Emergency Department Course:  Nursing notes and vitals reviewed. (2115) I performed an exam of the patient as documented above and discussed plan for discharge.    Findings and plan explained to the Patient. Patient discharged home with instructions regarding supportive care, medications, and reasons to return. The importance of close follow-up was reviewed. The patient was prescribed Augmentin and Norco.      I personally answered all related questions prior to discharge.        Impression & Plan    Medical Decision Making:  Sage Luna is a 38 year old male presents with a facial pain.  There is no abscess detected around the tooth amenable to incision and drainage.  The differential diagnosis includes: cracked tooth syndrome, pulpitis, sub-apical abscess, amongst others.  There is no evidence of buccinator/canine space infections, or Enrico's angina. There are no posterior pharyngeal space infections detected.  Follow up with a dentist/endodontist in the coming days is indicated for further work up and treatment. He is in stable condition at the time of discharge, indications for return to the ED were discussed as well as follow up. All questions were answered and he is in agreement with the plan.    Diagnosis:    ICD-10-CM    1. Pain, dental K08.89    2. Facial swelling R22.0        Disposition:  discharged to home    Discharge Medications:     Medication List      Started     HYDROcodone-acetaminophen 5-325 MG tablet  Commonly known as:  NORCO  1 tablet, Oral, EVERY 6 HOURS PRN        ASK your doctor about these medications    ondansetron 4 MG ODT tab  Commonly known as:  ZOFRAN ODT  4 mg, Oral, EVERY 8 HOURS PRN  Ask about: Should I take this medication?          Scribe Disclosure:  I, Ursula Matthew, am serving as a scribe on 6/10/2019 at 9:03 PM to personally document services performed by Jl De Souza MD based on my observations and the provider's statements to me.       Ursula Matthew  6/10/2019   [facility] EMERGENCY DEPARTMENT       Jl De Souza MD  06/14/19 0540

## 2019-06-11 NOTE — DISCHARGE INSTRUCTIONS
Discharge Instructions  Dental Pain    You have been seen today for a toothache. Your pain may be caused by an exposed nerve, an infection (pulpitis), a root abscess, or other problems. You will need to see a dentist for a solution to your tooth problem. Emergency Department care is only to help control your problem until you can see a dentist.  Today, we did not find any sign that your toothache was caused by a serious condition, but sometimes symptoms develop over time and cannot be found during an emergency visit, so it is very important that you follow up with your dentist.      Return to the Emergency Department if:  You develop a fever over 101 degrees Fahrenheit  You can?t open your mouth normally, can?t move your tongue well, or can?t swallow  You have new or increased swelling of your face or neck.  You develop drainage of pus or foul smelling material from around your tooth.  What can I do to help myself?  Take any antibiotic the doctor may have prescribed for you today.  Avoid very hot or very cold foods as both can cause pain.  Make an appointment to see a dentist as soon as possible. If you wish, we can provide you with a list of low-cost dental clinics.       Remember that you can always come back to the Emergency Department if you are not able to see your regular doctor in the amount of time listed above, if you get any new symptoms, or if there is anything that worries you.        Dental Resources  Name/Address/Phone Eligibility Hours Fee   [facility]  [address], [address]  [place] [address]  [phone] Anyone Call for appointment [facility] Emergency Department  Medical [facility]  Private Insurance   [facility]  Hy[facility]  [address] [place], [place] [id number] [phone] Anyone Call for appointment    Arg\[facility] [place] [facility]\"" refers to low-cost dental clinics for non-preventive care    Kinyarwanda Interpreters available Prices start at   Adults        Cleaning $36-$160        X-Ray  $20-40  Children        Cleaning $15        X-ray $10-20        Fluoride $10  Accepts cash, check or credit;  Does not take insurance or MA.   [facility]  [address] [place], [place]  [id number] [phone] Anyone Afternoons and evenings    September-May    Answers phones after 10 AM $30.00 per visit   ($15.00 per visit if 62 or older)   Preventive care.  Restoration care; sliding fee; MA   [facility]  [address] [place], [place] [phone] Children birth to age 18 and pregnant women    [facility] Residents without insurance will be asked to apply for Assured Care. M TH F 8:30 am - 5 pm  T W 8:30 am - 7 pm    30 locations metro wide    Call for appointment and to confirm hours. Sliding Fee  [facility]  Medical Assistance  Assured Access  Private Insurance    8 Languages Spoken   [facility] [address] [address]  [phone] Anyone Call for appointment Sliding Fee  Accept insurance, [place],   [place] and self-pay.  Call if no insurance.    All services provided.  Staff fluent in Hmong, Laotian, Dionicio, Malay, Syriac, Pashto, and Farsi.   [facility]  [address] [place] [address]  [phone] Children  Adults in a walk in basis Mon - Fri. 8 - 5 pm       (closed 1-2 pm)  General Dentists & Hygienists  Private Dentists  Dentures Fees based on family size and income ranging from 40% - 100% payment by patient.   [facility]  [address] [place], [place]  [id number] [phone] Anyone Mon - Fri 7:30 am - 5:00 pm  By Appt.    Tues & Wed @ 3:00 call for urgent care Appt for next day service Sliding fee:  MA; Insurance   [facility]  [address] [place], [place]  [id number] [phone] Anyone Call for an appointment.  Days open vary every semester. Adult cleaning $25  Child cleaning $15  X-Rays $10-$15  Whitening  services available  $75, includes cleaning  Seniors 50% off   [facility]  [address] [place]  [address]  [phone] Anyone M-F 8 am - 5 pm Most insurances accepted.  MA and Sliding Scale.   [facility]  [facility] [place], [place]  [phone] Anyone without insurance Call to make appt   M-F 9:00 am - 5 pm   (Closed noon hour 12-1)    6 pm- 8 pm Evening hours also available for care Sliding fee based on income and size of household.   [facility]  Dental Clinic  [address] [place]  [address]  [phone] press option 1    For the [facility] press option 4 Anyone              Anyone Monday  4 - 6:30 pm  Tuesday 12:30 - 3 & 4-6:30  Thursday 8:30 - 11 am & 12-2:30 pm  September through May only    All year around on Thursdays from 5-9 pm (only time a dentist is in.) Cleanings & X-Rays Only  Cleanings:  Adults $30                   Kids $20  X-Rays:  Adults $34                Kids $10    MA and Sliding fee   [address] [phone] Anyone    (Mapbarong interpreters available) M-F 8:00 am - 5:00 pm       By appointment only  (same day appointments available) Sliding fee ($40+ may be due at appointment, remainder billed); MA; Insurance   [facility]  [address] [place], [place] [phone]   Anyone    (Hmong interpreters available) M-Th 8:00 am - 8:00 pm  F 8:00 am - 5:00 pm    By appointment only  (same day appointments available) Sliding fee ($40+ may be due at appointment, remainder billed); MA; Insurance   [facility]  [address] [place], [place]  [id number] [phone] Anyone    Must live within [facility] to qualify for sliding fee services Mon, Tues, Thurs, Fri  8:30 am - 5:00 pm  Wed. 8:30 am - 7:00 pm  All other services by appt. only Sliding  Fee; MA   Offer payment plans    Have financial workers that will assist with [place] and will use sliding fee for those who do not qualify.      [facility]  [address] [place] [address]  [phone] Anyone without insurance     Hours and day of week vary  (Call ahead for hours)    Walk-in only Free Services    Cleanings; Fillings; Extractions   [facility]  [address] [place] [address]  [address]  [phone] Anyone Call for an appointment Accept patients with [place] and Medical Assistance.  10% discount if bill is paid in full on day of service.  No sliding fee scale.     [facility]  [address] [place] [address]  [phone] Anyone M-F  8 am-5 pm  Call for appt.    Walk-in hours 8 am - 11am and 1 pm - 5 pm, however take scheduled appointments first    No emergency services or oral surgeries Sliding Fee available with an [place] or [place] denial letter and proof of income.    Accepts Assured Access card and [place] coverage.     Name/Address/Phone Eligibility Hours Fee   [facility]  [address] [place]  [address]  [phone] Anyone with emergencies only M & W clinic begins at 6 pm   Call ahead    Alternate Fridays for children by Appt only Free   [facility]  [address] [place], [place] [address] [phone]    Emergencies (adults only):  [phone] Anyone Free walk-in screens for oral surgery    Call ahead for hours    All other services by appt. only  Accepts MA for pediatrics only    Rates are about 25% - 40% less than private dental office.    No sliding fee scale   [facility]  [address] [place]  [address]  [phone] Anyone as long as they do not have health insurance Hours on Mondays, Tuesdays, and Thursdays Sliding fees based on monthly income    No root canals, tooth pulls or emergencies   [facility] [place] [facility]  [address]  [facility] [phone] Anyone  M - F 8:00 am - 5:00 pm  Wed 8:00 am - 8 pm Sliding fees; [place]; Insurance   [facility]  [address]  [place], [place]  [phone] Anyone age 55+ M - F 8:00 am - 4:30 pm    Appt. only Set slightly lower fees;  MA; Insurance         Give Kids a smile day in [facility] Children Takes place in February at a few locations                          Dental Pain:      Dear Emergency Department Patient:     Here at [facility], we are always pleased to evaluate you for emergency conditions and offer a screening examination. Today, we have seen you and determined that you have dental pain and/or a dental problem.  We do not have the equipment and/or advanced training to perform definitive dental care, therefore, you need to be seen by a dentist for further care.     You may see your regular dent  ist if you have one, or we have attached a list of community dental providers, including some who provide care at a reduced fee.      Please be aware that if a narcotic medication was prescribed, it will not be refilled in the emergency department.  Accordingly, if you should have ongoing problems and/or pain, you should contact a dentist right away for definitive treatment.    Sincerely,        [facility])

## 2019-06-14 ENCOUNTER — RECORDS - HEALTHEAST (OUTPATIENT)
Dept: ADMINISTRATIVE | Facility: OTHER | Age: 39
End: 2019-06-14

## 2019-06-25 ENCOUNTER — RECORDS - HEALTHEAST (OUTPATIENT)
Dept: HEALTH INFORMATION MANAGEMENT | Facility: CLINIC | Age: 39
End: 2019-06-25

## 2019-07-01 ENCOUNTER — TRANSFERRED RECORDS (OUTPATIENT)
Dept: MULTI SPECIALTY CLINIC | Facility: CLINIC | Age: 39
End: 2019-07-01

## 2019-08-06 ENCOUNTER — APPOINTMENT (OUTPATIENT)
Dept: CT IMAGING | Facility: CLINIC | Age: 39
End: 2019-08-06
Attending: INTERNAL MEDICINE
Payer: COMMERCIAL

## 2019-08-06 ENCOUNTER — APPOINTMENT (OUTPATIENT)
Dept: GENERAL RADIOLOGY | Facility: CLINIC | Age: 39
End: 2019-08-06
Attending: INTERNAL MEDICINE
Payer: COMMERCIAL

## 2019-08-06 ENCOUNTER — RECORDS - HEALTHEAST (OUTPATIENT)
Dept: ADMINISTRATIVE | Facility: OTHER | Age: 39
End: 2019-08-06

## 2019-08-06 ENCOUNTER — TELEPHONE (OUTPATIENT)
Facility: CLINIC | Age: 39
End: 2019-08-06

## 2019-08-06 ENCOUNTER — TRANSFERRED RECORDS (OUTPATIENT)
Dept: HEALTH INFORMATION MANAGEMENT | Facility: CLINIC | Age: 39
End: 2019-08-06

## 2019-08-06 ENCOUNTER — HOSPITAL ENCOUNTER (INPATIENT)
Facility: CLINIC | Age: 39
LOS: 1 days | Discharge: HOME | End: 2019-08-07
Attending: INTERNAL MEDICINE | Admitting: INTERNAL MEDICINE
Payer: COMMERCIAL

## 2019-08-06 DIAGNOSIS — R50.9 FEVER, UNSPECIFIED FEVER CAUSE: Primary | ICD-10-CM

## 2019-08-06 LAB
ALBUMIN UR-MCNC: 30 MG/DL
AMPHETAMINES UR QL SCN: NEGATIVE
APPEARANCE UR: CLEAR
BARBITURATES UR QL: NEGATIVE
BASE DEFICIT BLDV-SCNC: 0 MMOL/L
BENZODIAZ UR QL: NEGATIVE
BILIRUB UR QL STRIP: NEGATIVE
CANNABINOIDS UR QL SCN: NEGATIVE
COCAINE UR QL: NEGATIVE
COLOR UR AUTO: YELLOW
GLUCOSE BLDC GLUCOMTR-MCNC: 265 MG/DL (ref 70–99)
GLUCOSE BLDC GLUCOMTR-MCNC: 295 MG/DL (ref 70–99)
GLUCOSE UR STRIP-MCNC: >1000 MG/DL
HBA1C MFR BLD: 9.8 % (ref 0–5.6)
HCO3 BLDV-SCNC: 26 MMOL/L (ref 21–28)
HGB UR QL STRIP: NEGATIVE
KETONES BLD-SCNC: 2.4 MMOL/L (ref 0–0.6)
KETONES UR STRIP-MCNC: 150 MG/DL
LACTATE BLD-SCNC: 1.9 MMOL/L (ref 0.7–2)
LEUKOCYTE ESTERASE UR QL STRIP: NEGATIVE
MUCOUS THREADS #/AREA URNS LPF: PRESENT /LPF
NITRATE UR QL: NEGATIVE
O2/TOTAL GAS SETTING VFR VENT: NORMAL %
OPIATES UR QL SCN: NEGATIVE
OXYHGB MFR BLDV: 51 %
PCO2 BLDV: 44 MM HG (ref 40–50)
PCP UR QL SCN: NEGATIVE
PH BLDV: 7.37 PH (ref 7.32–7.43)
PH UR STRIP: 6 PH (ref 5–7)
PO2 BLDV: 27 MM HG (ref 25–47)
RBC #/AREA URNS AUTO: 1 /HPF (ref 0–2)
SOURCE: ABNORMAL
SP GR UR STRIP: 1.02 (ref 1–1.03)
UROBILINOGEN UR STRIP-MCNC: NORMAL MG/DL (ref 0–2)
WBC #/AREA URNS AUTO: 1 /HPF (ref 0–5)

## 2019-08-06 PROCEDURE — 83036 HEMOGLOBIN GLYCOSYLATED A1C: CPT | Performed by: INTERNAL MEDICINE

## 2019-08-06 PROCEDURE — 25000131 ZZH RX MED GY IP 250 OP 636 PS 637: Performed by: INTERNAL MEDICINE

## 2019-08-06 PROCEDURE — 25000128 H RX IP 250 OP 636: Performed by: INTERNAL MEDICINE

## 2019-08-06 PROCEDURE — 74177 CT ABD & PELVIS W/CONTRAST: CPT

## 2019-08-06 PROCEDURE — 00000146 ZZHCL STATISTIC GLUCOSE BY METER IP

## 2019-08-06 PROCEDURE — 71046 X-RAY EXAM CHEST 2 VIEWS: CPT

## 2019-08-06 PROCEDURE — 82805 BLOOD GASES W/O2 SATURATION: CPT | Performed by: INTERNAL MEDICINE

## 2019-08-06 PROCEDURE — 82010 KETONE BODYS QUAN: CPT | Performed by: INTERNAL MEDICINE

## 2019-08-06 PROCEDURE — 99222 1ST HOSP IP/OBS MODERATE 55: CPT | Mod: AI | Performed by: INTERNAL MEDICINE

## 2019-08-06 PROCEDURE — 87040 BLOOD CULTURE FOR BACTERIA: CPT | Performed by: INTERNAL MEDICINE

## 2019-08-06 PROCEDURE — 36415 COLL VENOUS BLD VENIPUNCTURE: CPT | Performed by: INTERNAL MEDICINE

## 2019-08-06 PROCEDURE — 83605 ASSAY OF LACTIC ACID: CPT | Performed by: INTERNAL MEDICINE

## 2019-08-06 PROCEDURE — 80307 DRUG TEST PRSMV CHEM ANLYZR: CPT | Performed by: INTERNAL MEDICINE

## 2019-08-06 PROCEDURE — 81001 URINALYSIS AUTO W/SCOPE: CPT | Performed by: INTERNAL MEDICINE

## 2019-08-06 PROCEDURE — 12000013 ZZH R&B PEDS

## 2019-08-06 RX ORDER — AMOXICILLIN 250 MG
2 CAPSULE ORAL 2 TIMES DAILY PRN
Status: DISCONTINUED | OUTPATIENT
Start: 2019-08-06 | End: 2019-08-07 | Stop reason: HOSPADM

## 2019-08-06 RX ORDER — NICOTINE POLACRILEX 4 MG
15-30 LOZENGE BUCCAL
Status: DISCONTINUED | OUTPATIENT
Start: 2019-08-06 | End: 2019-08-07 | Stop reason: HOSPADM

## 2019-08-06 RX ORDER — AMOXICILLIN 250 MG
1 CAPSULE ORAL 2 TIMES DAILY PRN
Status: DISCONTINUED | OUTPATIENT
Start: 2019-08-06 | End: 2019-08-07 | Stop reason: HOSPADM

## 2019-08-06 RX ORDER — NITROGLYCERIN 0.4 MG/1
0.4 TABLET SUBLINGUAL EVERY 5 MIN PRN
Status: DISCONTINUED | OUTPATIENT
Start: 2019-08-06 | End: 2019-08-07 | Stop reason: HOSPADM

## 2019-08-06 RX ORDER — DEXTROSE MONOHYDRATE, SODIUM CHLORIDE, AND POTASSIUM CHLORIDE 50; 1.49; 4.5 G/1000ML; G/1000ML; G/1000ML
INJECTION, SOLUTION INTRAVENOUS CONTINUOUS
Status: DISCONTINUED | OUTPATIENT
Start: 2019-08-06 | End: 2019-08-06

## 2019-08-06 RX ORDER — POLYETHYLENE GLYCOL 3350 17 G/17G
17 POWDER, FOR SOLUTION ORAL DAILY PRN
Status: DISCONTINUED | OUTPATIENT
Start: 2019-08-06 | End: 2019-08-07 | Stop reason: HOSPADM

## 2019-08-06 RX ORDER — SODIUM CHLORIDE 9 MG/ML
INJECTION, SOLUTION INTRAVENOUS CONTINUOUS
Status: DISCONTINUED | OUTPATIENT
Start: 2019-08-06 | End: 2019-08-07 | Stop reason: HOSPADM

## 2019-08-06 RX ORDER — SODIUM CHLORIDE 9 MG/ML
INJECTION, SOLUTION INTRAVENOUS CONTINUOUS
Status: DISCONTINUED | OUTPATIENT
Start: 2019-08-06 | End: 2019-08-06

## 2019-08-06 RX ORDER — BISACODYL 10 MG
10 SUPPOSITORY, RECTAL RECTAL DAILY PRN
Status: DISCONTINUED | OUTPATIENT
Start: 2019-08-06 | End: 2019-08-07 | Stop reason: HOSPADM

## 2019-08-06 RX ORDER — LIDOCAINE 40 MG/G
CREAM TOPICAL
Status: DISCONTINUED | OUTPATIENT
Start: 2019-08-06 | End: 2019-08-07 | Stop reason: HOSPADM

## 2019-08-06 RX ORDER — ACETAMINOPHEN 325 MG/1
650 TABLET ORAL EVERY 4 HOURS PRN
Status: DISCONTINUED | OUTPATIENT
Start: 2019-08-06 | End: 2019-08-07 | Stop reason: HOSPADM

## 2019-08-06 RX ORDER — DEXTROSE MONOHYDRATE 25 G/50ML
25-50 INJECTION, SOLUTION INTRAVENOUS
Status: DISCONTINUED | OUTPATIENT
Start: 2019-08-06 | End: 2019-08-07 | Stop reason: HOSPADM

## 2019-08-06 RX ORDER — ONDANSETRON 2 MG/ML
4 INJECTION INTRAMUSCULAR; INTRAVENOUS EVERY 6 HOURS PRN
Status: DISCONTINUED | OUTPATIENT
Start: 2019-08-06 | End: 2019-08-07 | Stop reason: HOSPADM

## 2019-08-06 RX ORDER — IOPAMIDOL 755 MG/ML
500 INJECTION, SOLUTION INTRAVASCULAR ONCE
Status: COMPLETED | OUTPATIENT
Start: 2019-08-06 | End: 2019-08-06

## 2019-08-06 RX ORDER — ONDANSETRON 4 MG/1
4 TABLET, ORALLY DISINTEGRATING ORAL EVERY 6 HOURS PRN
Status: DISCONTINUED | OUTPATIENT
Start: 2019-08-06 | End: 2019-08-07 | Stop reason: HOSPADM

## 2019-08-06 RX ORDER — ACETAMINOPHEN 650 MG/1
650 SUPPOSITORY RECTAL EVERY 4 HOURS PRN
Status: DISCONTINUED | OUTPATIENT
Start: 2019-08-06 | End: 2019-08-07 | Stop reason: HOSPADM

## 2019-08-06 RX ORDER — NALOXONE HYDROCHLORIDE 0.4 MG/ML
.1-.4 INJECTION, SOLUTION INTRAMUSCULAR; INTRAVENOUS; SUBCUTANEOUS
Status: DISCONTINUED | OUTPATIENT
Start: 2019-08-06 | End: 2019-08-07 | Stop reason: HOSPADM

## 2019-08-06 RX ADMIN — SODIUM CHLORIDE 1000 ML: 9 INJECTION, SOLUTION INTRAVENOUS at 23:54

## 2019-08-06 RX ADMIN — SODIUM CHLORIDE 1000 ML: 9 INJECTION, SOLUTION INTRAVENOUS at 20:53

## 2019-08-06 RX ADMIN — ONDANSETRON HYDROCHLORIDE 4 MG: 2 INJECTION, SOLUTION INTRAMUSCULAR; INTRAVENOUS at 20:56

## 2019-08-06 RX ADMIN — INSULIN ASPART 3 UNITS: 100 INJECTION, SOLUTION INTRAVENOUS; SUBCUTANEOUS at 22:56

## 2019-08-06 RX ADMIN — SODIUM CHLORIDE 61 ML: 9 INJECTION, SOLUTION INTRAVENOUS at 21:08

## 2019-08-06 RX ADMIN — INSULIN GLARGINE 40 UNITS: 100 INJECTION, SOLUTION SUBCUTANEOUS at 23:52

## 2019-08-06 RX ADMIN — IOPAMIDOL 84 ML: 755 INJECTION, SOLUTION INTRAVENOUS at 21:08

## 2019-08-06 NOTE — TELEPHONE ENCOUNTER
3-Hospitalist Huddle Documentation    Acuity/Preferred Bed Type: medical floor  Infection Concerns: none  Additional Specialist Needed Or Specialist Already Contacted:   None  Timely Treatments Needed: No  Important things to know/address during hospitalization: sitter not needed    37 yo with hx of type I DM, lantus inj /novolog  Fever vomiting high as 102 started yesterday. No abd pain.   Exam has 12x14 cellulitis of the leg  bicard nomral 290 BG  No DKA  WBC 9k  LFT lipase normal  1.7 lactic   US leg subcu edema, no abscess left anterior chin  S/p Rocephin x1, No known hx of MRSA   Here for IV abx for LLE cellulitis

## 2019-08-07 ENCOUNTER — RECORDS - HEALTHEAST (OUTPATIENT)
Dept: ADMINISTRATIVE | Facility: OTHER | Age: 39
End: 2019-08-07

## 2019-08-07 VITALS
BODY MASS INDEX: 25.58 KG/M2 | SYSTOLIC BLOOD PRESSURE: 108 MMHG | OXYGEN SATURATION: 95 % | TEMPERATURE: 98.9 F | RESPIRATION RATE: 20 BRPM | DIASTOLIC BLOOD PRESSURE: 61 MMHG | HEIGHT: 70 IN | WEIGHT: 178.7 LBS

## 2019-08-07 LAB
ALBUMIN SERPL-MCNC: 2.8 G/DL (ref 3.4–5)
ALP SERPL-CCNC: 99 U/L (ref 40–150)
ALT SERPL W P-5'-P-CCNC: 32 U/L (ref 0–70)
ANION GAP SERPL CALCULATED.3IONS-SCNC: 7 MMOL/L (ref 3–14)
AST SERPL W P-5'-P-CCNC: 16 U/L (ref 0–45)
BILIRUB SERPL-MCNC: 0.4 MG/DL (ref 0.2–1.3)
BUN SERPL-MCNC: 11 MG/DL (ref 7–30)
CALCIUM SERPL-MCNC: 7.8 MG/DL (ref 8.5–10.1)
CHLORIDE SERPL-SCNC: 109 MMOL/L (ref 94–109)
CO2 SERPL-SCNC: 26 MMOL/L (ref 20–32)
CREAT SERPL-MCNC: 0.73 MG/DL (ref 0.66–1.25)
ERYTHROCYTE [DISTWIDTH] IN BLOOD BY AUTOMATED COUNT: 13.1 % (ref 10–15)
GFR SERPL CREATININE-BSD FRML MDRD: >90 ML/MIN/{1.73_M2}
GLUCOSE BLDC GLUCOMTR-MCNC: 177 MG/DL (ref 70–99)
GLUCOSE BLDC GLUCOMTR-MCNC: 184 MG/DL (ref 70–99)
GLUCOSE BLDC GLUCOMTR-MCNC: 189 MG/DL (ref 70–99)
GLUCOSE BLDC GLUCOMTR-MCNC: 217 MG/DL (ref 70–99)
GLUCOSE SERPL-MCNC: 193 MG/DL (ref 70–99)
HCT VFR BLD AUTO: 35.7 % (ref 40–53)
HGB BLD-MCNC: 12.1 G/DL (ref 13.3–17.7)
MCH RBC QN AUTO: 29.5 PG (ref 26.5–33)
MCHC RBC AUTO-ENTMCNC: 33.9 G/DL (ref 31.5–36.5)
MCV RBC AUTO: 87 FL (ref 78–100)
PARASITE SPEC INSPECT: NORMAL
PLATELET # BLD AUTO: 190 10E9/L (ref 150–450)
POTASSIUM SERPL-SCNC: 3.8 MMOL/L (ref 3.4–5.3)
PROT SERPL-MCNC: 6.5 G/DL (ref 6.8–8.8)
RBC # BLD AUTO: 4.1 10E12/L (ref 4.4–5.9)
SODIUM SERPL-SCNC: 142 MMOL/L (ref 133–144)
SPECIMEN SOURCE: NORMAL
WBC # BLD AUTO: 8.2 10E9/L (ref 4–11)

## 2019-08-07 PROCEDURE — 25000132 ZZH RX MED GY IP 250 OP 250 PS 637: Performed by: INTERNAL MEDICINE

## 2019-08-07 PROCEDURE — 25000128 H RX IP 250 OP 636: Performed by: INTERNAL MEDICINE

## 2019-08-07 PROCEDURE — 36415 COLL VENOUS BLD VENIPUNCTURE: CPT | Performed by: INTERNAL MEDICINE

## 2019-08-07 PROCEDURE — 86617 LYME DISEASE ANTIBODY: CPT | Performed by: INTERNAL MEDICINE

## 2019-08-07 PROCEDURE — 85027 COMPLETE CBC AUTOMATED: CPT | Performed by: INTERNAL MEDICINE

## 2019-08-07 PROCEDURE — 87207 SMEAR SPECIAL STAIN: CPT | Performed by: INTERNAL MEDICINE

## 2019-08-07 PROCEDURE — 00000146 ZZHCL STATISTIC GLUCOSE BY METER IP

## 2019-08-07 PROCEDURE — 80053 COMPREHEN METABOLIC PANEL: CPT | Performed by: INTERNAL MEDICINE

## 2019-08-07 PROCEDURE — 25800030 ZZH RX IP 258 OP 636: Performed by: INTERNAL MEDICINE

## 2019-08-07 PROCEDURE — 87015 SPECIMEN INFECT AGNT CONCNTJ: CPT | Performed by: INTERNAL MEDICINE

## 2019-08-07 PROCEDURE — 99239 HOSP IP/OBS DSCHRG MGMT >30: CPT | Performed by: INTERNAL MEDICINE

## 2019-08-07 RX ORDER — ONDANSETRON 4 MG/1
4 TABLET, ORALLY DISINTEGRATING ORAL EVERY 6 HOURS PRN
Qty: 20 TABLET | Refills: 0 | Status: SHIPPED | OUTPATIENT
Start: 2019-08-07 | End: 2019-09-26

## 2019-08-07 RX ORDER — LORAZEPAM 2 MG/ML
0.5 INJECTION INTRAMUSCULAR EVERY 4 HOURS PRN
Status: DISCONTINUED | OUTPATIENT
Start: 2019-08-07 | End: 2019-08-07 | Stop reason: HOSPADM

## 2019-08-07 RX ORDER — ACETAMINOPHEN 325 MG/1
TABLET ORAL PRN
COMMUNITY
End: 2019-09-26

## 2019-08-07 RX ADMIN — SODIUM CHLORIDE: 9 INJECTION, SOLUTION INTRAVENOUS at 00:32

## 2019-08-07 RX ADMIN — ACETAMINOPHEN 650 MG: 325 TABLET, FILM COATED ORAL at 08:55

## 2019-08-07 RX ADMIN — ONDANSETRON HYDROCHLORIDE 4 MG: 2 INJECTION, SOLUTION INTRAMUSCULAR; INTRAVENOUS at 08:00

## 2019-08-07 RX ADMIN — SODIUM CHLORIDE: 9 INJECTION, SOLUTION INTRAVENOUS at 08:07

## 2019-08-07 ASSESSMENT — ACTIVITIES OF DAILY LIVING (ADL)
ADLS_ACUITY_SCORE: 13

## 2019-08-07 ASSESSMENT — MIFFLIN-ST. JEOR: SCORE: 1731.83

## 2019-08-07 NOTE — DISCHARGE SUMMARY
[facility]  Hospitalist Discharge Summary       Date of Admission:  8/6/2019  Date of Discharge:  8/7/2019  Discharging Provider: Puneet Melchor,       Discharge Diagnoses   1.  Nausea and fever.  Unclear source.  Does have a small area of erythema on his left shin.  No obvious acute cellulitis.  This is chronic and unchanged from baseline.  This is  unlikely to be source of his fever.  CT scan of the abdomen and pelvis does show possible colitis.  Suspect he has a viral gastroenteritis.  He has not had any diarrhea to check stool studies.  He does have a history of Lyme's disease.  He has been outside quite a bit.  Was concerned about possible tickborne illness.  Babesia and Ehrlichia stain sent on 8/7.  Results pending.  Lyme's IgG and IgM also sent on 8/7 with results still pending.     2.  Type 1 diabetes mellitus.  Blood sugars elevated at time of admission due to suspected gastroenteritis.  Blood sugars under much better control with IV fluids and continued insulin during hospital stay.  Continue with home doses of insulin at time of discharge.       Follow-ups Needed After Discharge   Follow-up Appointments     Follow-up and recommended labs and tests       Follow up with primary care provider, Que Wu, within 7 days   for hospital follow- up.             Discharge Disposition   Discharged to home  Condition at discharge: Stable    Hospital Course   Sage Luna is a 38 year old male admitted on 8/6/2019. He has a past medical history significant for diabetes mellitus type 1.  He presented to outside urgent care for nausea and fever.  There was concern at outside facility for possible left leg cellulitis.  However, he has chronic skin changes from previous infection on his left leg.  Skin changes are unchanged at this time.  He has had a dose of IV ceftriaxone at urgent care.  Further testing during hospital stay have shown a suspected colitis on CT scan of the abdomen  and pelvis.  Suspect that he has a viral gastroenteritis causing his fever and nausea.  He is feeling much better by day of discharge after IV fluids and antiemetics as needed.  Is tolerating a diet.  No further antibiotics at this time.  He was concerned about possible tick exposure.  Did have to Babesia and Ehrlichia stain sent during hospital stay.  These are still pending at time of discharge.  Also Lyme's IgG G and IgM sent which are still pending at time of discharge.  Follow-up with primary care physician for results.    Consultations This Hospital Stay   PHARMACY DISCHARGE EDUCATION BY PHARMACIST    Code Status   Full Code    Time Spent on this Encounter   I spent 35 minutes with Mr. Luna and working on discharge on 8/7/19.       Puneet Melchor, [facility]  ______________________________________________________________________    Physical Exam   Vital Signs: Temp: 98.9  F (37.2  C) Temp src: Axillary BP: 108/61   Heart Rate: 102 Resp: 20 SpO2: 95 % O2 Device: None (Room air)    Weight: 178 lbs 11.2 oz  Gen:  NAD, A&Ox3.  Eyes:  PERRL, sclera anicteric.  OP:  MMM, no lesions.  Neck:  Supple.  CV:  Regular, no murmurs.  Lung:  CTA b/l, normal effort.  Ab:  +BS, soft.  Skin:  Warm, dry to touch.  No acute rash.  Erythematous skin change on left lower shin is unchanged from baseline according to patient.  Ext:  No pitting edema LE b/l.         Primary Care Physician   Que Wu    Discharge Orders      Reason for your hospital stay    Fever     Follow-up and recommended labs and tests     Follow up with primary care provider, Que Wu, within 7 days for hospital follow- up.     Activity    Your activity upon discharge: activity as tolerated     Full Code     Diet    Follow this diet upon discharge: Moderate consistent carbohydrate (6684-6697 terry / 4-6 CHO units per meal)           Discharge Medications   Current Discharge Medication List      START taking these  medications    Details   ondansetron (ZOFRAN-ODT) 4 MG ODT tab Take 1 tablet (4 mg) by mouth every 6 hours as needed for nausea or vomiting  Qty: 20 tablet, Refills: 0    Associated Diagnoses: Fever, unspecified fever cause         CONTINUE these medications which have NOT CHANGED    Details   acetaminophen (TYLENOL) 325 MG tablet Take by mouth as needed      !! insulin aspart (NOVOLOG FLEXPEN) 100 UNIT/ML pen Inject 8 Units Subcutaneous 3 times daily (with meals) In addition to sliding scale      !! insulin aspart (NOVOLOG FLEXPEN) 100 UNIT/ML pen Inject Subcutaneous 3 times daily (with meals) Sliding scale in addition to baseline 8 units with meals - usually uses 8-14 units total with each meal.      insulin glargine (LANTUS PEN) 100 UNIT/ML pen Inject 40 Units Subcutaneous At Bedtime    Comments: If Lantus is not covered by insurance, may substitute Basaglar at same dose and frequency.         !! - Potential duplicate medications found. Please discuss with provider.        Allergies   No Known Allergies

## 2019-08-07 NOTE — PHARMACY - DISCHARGE MEDICATION RECONCILIATION AND EDUCATION
Discharge medication review for this patient is complete. Face-to-face medication education was provided by the pharmacist.  See [facility] for allergy information and immunization status.   Pharmacist assisted with medication reconciliation of discharge medications with PTA medications.    Patient was educated on the following for each discharge medication:   Rationale for therapy  Duration of treatment  Dosing and or monitoring drug levels  Common side effects  Importance of compliance  Drug/food interactions  Missed doses  Self monitoring parameters    Left written materials and instructions (Clinical notes from [facility]) for new medications: Diabetes education booklet.    OUTCOMES: Patient verbalized understanding      Discharge Medication List     Review of your medicines      START taking      Dose / Directions   ondansetron 4 MG ODT tab  Commonly known as:  ZOFRAN-ODT      Dose:  4 mg  Take 1 tablet (4 mg) by mouth every 6 hours as needed for nausea or vomiting  Quantity:  20 tablet  Refills:  0        CONTINUE these medicines which have NOT CHANGED      Dose / Directions   acetaminophen 325 MG tablet  Commonly known as:  TYLENOL      Take by mouth as needed  Refills:  0     insulin glargine 100 UNIT/ML pen  Commonly known as:  LANTUS PEN      Dose:  40 Units  Inject 40 Units Subcutaneous At Bedtime  Refills:  0     * NovoLOG FLEXPEN 100 UNIT/ML pen  Generic drug:  insulin aspart      Dose:  8 Units  Inject 8 Units Subcutaneous 3 times daily (with meals) In addition to sliding scale  Refills:  0     * NovoLOG FLEXPEN 100 UNIT/ML pen  Generic drug:  insulin aspart      Inject Subcutaneous 3 times daily (with meals) Sliding scale in addition to baseline 8 units with meals - usually uses 8-14 units total with each meal.  Refills:  0         * This list has 2 medication(s) that are the same as other medications prescribed for you. Read the directions carefully, and ask your doctor or other care provider to review them with  you.               Where to get your medicines      These medications were sent to [facility] [place] - [address] [address]  [address] [address]    Phone:  [phone]     ondansetron 4 MG ODT tab

## 2019-08-07 NOTE — PLAN OF CARE
Pt hospitalized for fever.  Discharge education provided to patient and patient verbalized understanding of medications and orders.  Medications filled at [facility] pharmacy.   Pt verbalized will follow up with primary care provider.  Patient discharging to home  And transportation provided by family. No further questions at this time.

## 2019-08-07 NOTE — PROGRESS NOTES
Pt a direct admit around 2000 to floor.    Pt Tachycardic, pulse 118-120. Respirations 20. Sats 99%. Blood pressure 153/89.   Emesis x2  Zofran given.  Bowel sounds active.  Abdomen soft and flat.   Pt reports that he's not voided since this morning. 2 bolus in urgent care, 3rd bolus infusing now.   Blood sugar on admission 295.   Chest Xray and CT completed       Lactic level 1.9, blood ketones 2.4 - MD notified  Blood sugar 265 - 3 units of Novalog given  VSS. Continues to have tachycardia   4th fluid bolus infusing.   Pt now voiding x2  No further vomiting

## 2019-08-07 NOTE — PROVIDER NOTIFICATION
hospitalist paged: Just transferred from peds, large emesis now, can pt have something additional for nausea? Too early for Zofran. Thanks

## 2019-08-07 NOTE — H&P
[facility]    History and Physical - Hospitalist Service       Date of Admission:  8/6/2019    Assessment & Plan   Sage Luna is a 38 year old male admitted on 8/6/2019. He has a past medical history significant for diabetes mellitus type 1.  He presented to outside urgent care for nausea and fever.    1.  Nausea and fever.  Unclear source.  Does have a small area of erythema on his left shin.  This is actually improved from previous per his report.  Does not appear to be acutely infected.  Will look for other source at this time.  Check 2 blood cultures stat.  Check chest x-ray, urine analysis, and CT scan of the abdomen and pelvis.  He has not noticed any stiff neck.  Is reported to have had a dose of IV ceftriaxone at outside urgent care.  Hold off on further antibiotics until urinalysis, chest x-ray, and CT scan results available.    2.  Type 1 diabetes mellitus.  Restart Lantus at 30 units at bedtime.  Start NovoLog 10 units with meals.  NovoLog sliding scale.  Check ketones and venous blood gas.       Diet: Moderate Consistent CHO Diet    DVT Prophylaxis: Ambulate every shift  Francis Catheter: not present  Code Status: Full Code      Disposition Plan   Expected discharge: 2 - 3 days, recommended to prior living arrangement       Puneet Melchor, DO  [facility]    ______________________________________________________________________    Chief Complaint   Fever, nausea, and abdominal pain.    History is obtained from the patient    History of Present Illness   Sage Luna is a 38 year old male who has a past medical history significant for diabetes mellitus type 1.  He developed fever, nausea, and abdominal pain yesterday.  He has not vomited.  Noted fever to be as high as 102  F at home today.  Has not noted any dysuria.  Does have an occasional cough.  No chest pain.  Does not feel short of breath.  Generally feels like he has the flu.  Has not been around anyone else  sick.  No diarrhea.  Has felt this way in the past without specific diagnosis.  He had presented to outside urgent care for evaluation of above-noted symptoms.  Nothing at home seem to be helping symptoms.  He continues to feel nausea at this time.  Fevers seem a little better at the moment.  He does have a chronic area of redness on his left shin.  This actually appears improved from previous per his report.  It was felt that he had a left leg cellulitis at outside urgent care.  He was given a dose of IV ceftriaxone.  Sent to hospital for direct admission for further treatment of suspected cellulitis.  He has not been eating or drinking well due to above-noted nausea and abdominal pain.  Has not urinated now for several hours.  Feels dehydrated.  Does not notice fast heartbeat.  No palpitations.  No other acute complaints.    Review of Systems    The 10 point Review of Systems is negative other than noted in the HPI     Past Medical History    I have reviewed this patient's medical history and updated it with pertinent information if needed.   Past Medical History:   Diagnosis Date     Diabetes (H)      Lyme disease        Past Surgical History   I have reviewed this patient's surgical history and updated it with pertinent information if needed.  Past Surgical History:   Procedure Laterality Date     ARTHROSCOPY KNEE Right 3/25/2015    Procedure: ARTHROSCOPY KNEE;  Surgeon: Te Mcfarland MD;  Location:  OR     ARTHROSCOPY KNEE WITH LATERAL MENISCECTOMY  9/4/2014    Procedure: ARTHROSCOPY KNEE WITH LATERAL MENISCECTOMY;  Surgeon: Te Mcfarland MD;  Location:  OR     OPEN REDUCTION INTERNAL FIXATION FEMUR DISTAL Right 9/4/2014    Procedure: OPEN REDUCTION INTERNAL FIXATION FEMUR DISTAL;  Surgeon: Te Mcfarland MD;  Location:  OR     REMOVE HARDWARE KNEE Right 3/25/2015    Procedure: REMOVE HARDWARE KNEE;  Surgeon: Te Mcfarland MD;  Location:  OR       Social History   I have reviewed this  patient's social history and updated it with pertinent information if needed.  Social History     Tobacco Use     Smoking status: Never Smoker     Smokeless tobacco: Never Used   Substance Use Topics     Alcohol use: Yes     Comment: seldom     Drug use: No       Family History   I have reviewed this patient's family history and updated it with pertinent information if needed.   Family History   Problem Relation Age of Onset     Hypertension Father      Cancer Maternal Grandmother          , colon cancer     Cancer Paternal Grandfather         leukemia, still alive       Prior to Admission Medications   Prior to Admission Medications   Prescriptions Last Dose Informant Patient Reported? Taking?   HYDROcodone-acetaminophen (NORCO) 5-325 MG tablet   No No   Sig: Take 1 tablet by mouth every 6 hours as needed for severe pain   ONE TOUCH ULTRA TEST VI STRP   No No   Sig: test 4 times a day as directed   acetaminophen (TYLENOL) 325 MG tablet   No No   Sig: Take 2 tablets (650 mg) by mouth every 4 hours   Patient taking differently: Take 650 mg by mouth every 4 hours as needed    amoxicillin-clavulanate (AUGMENTIN) 875-125 MG tablet   No No   Sig: Take 1 tablet by mouth 2 times daily for 10 days   insulin aspart (NOVOLOG PEN) 100 UNIT/ML soln   No No   Sig: Bedtime BG  -209 = 1 units.   -219 = 2 units.  -229 = 3 units.  -239 = 4 units.  -249 = 5 units.  -259 = 6 units.  -269 = 7 units.  -279 = 8 units.  -289 = 9 units.  -299 = 10 units.  -309 = 11 units.  -319 = 12 units.  -329 = 13 units.  -339 = 14 units.  -349 = 15 units.  BG Over 349 = 16 units.   insulin aspart (NOVOLOG PEN) 100 UNIT/ML soln   No No   Sig: 10 units with every meal in addition to correction sliding scale.   insulin aspart (NOVOLOG VIAL) 100 UNITS/ML soln   No No   Sig: BG=dose  140-149=1 unit  150-159=2 units  160-169=3 units  170-179=4 units  180-189=5  units  190-199=6 units  200-209=7 units  210-219=8 units  220-229=9 units  230-239=10 units  240-249=11 units  250-259=12 units  260-269=13 units  270-279=14 units  280-289=15 units  290-299=16 units  300-309=17 units  310-319=18 units  320-329=19 units  330-339=20 units  340-349=21 units  Over 349=22 units   insulin glargine (LANTUS VIAL) 100 UNIT/ML soln   No No   Sig: 15 units sc q am and 35 units sc q pm.   ondansetron (ZOFRAN ODT) 4 MG ODT tab   No No   Sig: Take 1 tablet (4 mg) by mouth every 8 hours as needed for nausea   senna-docusate (SENOKOT-S;PERICOLACE) 8.6-50 MG per tablet   No No   Sig: Take 1-2 tablets by mouth 2 times daily Take while on oral narcotics to prevent or treat constipation.   Patient not taking: Reported on 3/15/2019      Facility-Administered Medications: None     Allergies   No Known Allergies    Physical Exam   Vital Signs:     BP: (!) 153/89   Heart Rate: 118 Resp: 20        Weight: 0 lbs 0 oz    Gen:  NAD, A&Ox3.  Eyes:  PERRL, sclera anicteric.  OP:  MMM, no lesions.  Neck:  Supple.  CV:  Regular, mildly tachycardic, no murmurs.  Lung:  CTA b/l, normal effort.  Ab:  +BS, soft.  Skin:  Warm, dry to touch.  Area of erythema noted on left shin.  Patient reports that this is unchanged from chronic.  Ext:  No pitting edema LE b/l.      Data   Data reviewed today: I reviewed all medications, new labs and imaging results over the last 24 hours. I personally reviewed the EKG tracing showing Sinus tachycardia.    No lab results found in last 7 days.

## 2019-08-07 NOTE — PHARMACY-ADMISSION MEDICATION HISTORY
Admission medication history interview status for this patient is complete. See [facility] admission navigator for allergy information, prior to admission medications and immunization status.     Medication history interview source(s):Patient  Medication history resources (including written lists, pill bottles, clinic record):None  [facility]    Changes made to PTA medication list:  Added: none  Deleted: old entries  Changed: Lantus, Novolog    Actions taken by pharmacist (provider contacted, etc):None     Additional medication history information:None    Medication reconciliation/reorder completed by provider prior to medication history? Yes    Do you take OTC medications (eg tylenol, ibuprofen, fish oil, eye/ear drops, etc)? Y(Y/N)    For patients on insulin therapy: Y (Y/N)  Lantus/levemir/NPH/Mix 70/30 dose: Y   (Y/N) (see Med list for doses)   Sliding scale Novolog Y  If Yes, do you have a baseline novolog pre-meal dose: 8 units with meals  Patients eat three meals a day:   Y    How many episodes of hypoglycemia do you have per week: __rarely_____  How many missed doses do you have per week: __no____  How many times do you check your blood glucose per day: __2-3_____  Do you have a Continuous glucose monitor (CGM)   Y/N (remind pt that not approved for hospital use)   Any Barriers to therapy - Be specific :  cost of medications, comfortable with giving injections (if applicable), comfortable and confident with current diabetes regimen: Y/N ______________      Prior to Admission medications    Medication Sig Last Dose Taking? Auth Provider   insulin aspart (NOVOLOG FLEXPEN) 100 UNIT/ML pen Inject 8 Units Subcutaneous 3 times daily (with meals) In addition to sliding scale 8/5/2019 at Unknown time Yes Unknown, Entered By History   insulin aspart (NOVOLOG FLEXPEN) 100 UNIT/ML pen Inject Subcutaneous 3 times daily (with meals) Sliding scale in addition to baseline 8 units with meals - usually  uses 8-14 units total with each meal.  Yes Unknown, Entered By History   insulin glargine (LANTUS PEN) 100 UNIT/ML pen Inject 40 Units Subcutaneous At Bedtime 8/5/2019 at Unknown time Yes Unknown, Entered By History

## 2019-08-07 NOTE — CONSULTS
"CLINICAL NUTRITION SERVICES  -  ASSESSMENT NOTE      MALNUTRITION:  % Weight Loss:  None noted  % Intake:  Decreased intake does not meet criteria for malnutrition   Subcutaneous Fat Loss:  None observed  Muscle Loss:  None observed  Fluid Retention:  None noted    Malnutrition Diagnosis: Patient does not meet two of the above criteria necessary for diagnosing malnutrition        REASON FOR ASSESSMENT  Sage Luna is a 38 year old male seen by Registered Dietitian for Admission Nutrition Risk Screen for new/uncontrolled diabetes.      NUTRITION HISTORY  - Information obtained from patient and chart.  - Patient with a h/o DMI.  Admitted 2/2 nausea and fever.    - Home insulin regimen reviewed.  Follows with endo.  - Patient reports regular diet at home.    - Decrease in PO intakes d/t nausea x 2 days PTA.    - NKFA.       CURRENT NUTRITION ORDERS  Diet Order:     Mod CHO    Current Intake/Tolerance:  Able to hold down crackers thus far today.      NUTRITION FOCUSED PHYSICAL ASSESSMENT FOR DIAGNOSING MALNUTRITION)  Completed:  Yes Full assessment         Observed:    No fat or muscle loss observed    Obtained from Chart/Interdisciplinary Team:  No pertinent other than already noted    ANTHROPOMETRICS  Height: 5' 10\"  Weight: 81.1 kg (178#)  Body mass index is 25.64 kg/m .  Weight Status:  Overweight BMI 25-29.9  IBW: 75.5 kg (166#)  % IBW: 107%  Weight History:  Wt Readings from Last 10 Encounters:   08/07/19 81.1 kg (178 lb 11.2 oz)   03/15/19 76.6 kg (168 lb 12.8 oz)   03/04/19 74.8 kg (165 lb)   03/25/15 72.6 kg (160 lb)   09/04/14 77.1 kg (170 lb)   03/21/07 80.5 kg (177 lb 6.4 oz)   07/13/06 79.9 kg (176 lb 3.2 oz)   03/17/06 74.8 kg (165 lb)   01/11/06 69.9 kg (154 lb)   12/07/05 68 kg (150 lb)   - Patient denies wt loss PTA.  UBW reported as 170-175#.    LABS  Labs reviewed:  Lab Results   Component Value Date    A1C 9.8 08/06/2019    A1C 11.0 09/04/2014    A1C 10.7 03/14/2007    A1C 7.1 07/13/2006    A1C " 6.4 03/22/2006       MEDICATIONS  Medications reviewed including insulin regimen      ASSESSED NUTRITION NEEDS PER APPROVED PRACTICE GUIDELINES:    Dosing Weight 81.1 kg  Estimated Energy Needs: 6933-0395 kcals (25-30 Kcal/Kg)  Justification: maintenance  Estimated Protein Needs: 81-97 grams protein (1-1.2 g pro/Kg)  Justification: preservation of lean body mass with acute illness  Estimated Fluid Needs: per MD    MALNUTRITION:  % Weight Loss:  None noted  % Intake:  Decreased intake does not meet criteria for malnutrition   Subcutaneous Fat Loss:  None observed  Muscle Loss:  None observed  Fluid Retention:  None noted    Malnutrition Diagnosis: Patient does not meet two of the above criteria necessary for diagnosing malnutrition    NUTRITION DIAGNOSIS:  Altered nutrition-related lab value related to insulin regimen as evidenced by A1C >9%.      NUTRITION INTERVENTIONS  Recommendations / Nutrition Prescription  Ok to continue mod CHO diet order, outpatient follow-up with endocrinology.      Implementation  Nutrition education: No education needs assessed at this time.  Discussed mod CHO diet allows up to 90 g CHO/meal..    Collaboration and Referral of Nutrition care: Discussed POC with team during rounds and with RN.      Nutrition Goals  BG <200 during review period.      MONITORING AND EVALUATION:  Progress towards goals will be monitored and evaluated per protocol and Practice Guidelines            Camelia Ballesteros RD, LD  Clinical Dietitian  3rd floor/ICU: [phone]  All other floors: [phone]  Weekend/holiday: [phone]

## 2019-08-08 LAB
B BURGDOR IGG SER QL IB: NEGATIVE
B BURGDOR IGM SER QL IB: NEGATIVE

## 2019-08-09 ENCOUNTER — COMMUNICATION - HEALTHEAST (OUTPATIENT)
Dept: INTERNAL MEDICINE | Facility: CLINIC | Age: 39
End: 2019-08-09

## 2019-08-09 DIAGNOSIS — E10.9 TYPE 1 DIABETES MELLITUS (H): ICD-10-CM

## 2019-08-12 ENCOUNTER — RECORDS - HEALTHEAST (OUTPATIENT)
Dept: ADMINISTRATIVE | Facility: OTHER | Age: 39
End: 2019-08-12

## 2019-08-13 LAB
BACTERIA SPEC CULT: NO GROWTH
BACTERIA SPEC CULT: NO GROWTH
Lab: NORMAL
Lab: NORMAL
SPECIMEN SOURCE: NORMAL
SPECIMEN SOURCE: NORMAL

## 2019-09-24 ENCOUNTER — PRE VISIT (OUTPATIENT)
Dept: PEDIATRICS | Facility: CLINIC | Age: 39
End: 2019-09-24

## 2019-09-24 NOTE — TELEPHONE ENCOUNTER
"Future Appointments   Date Time Provider Department Center   9/26/2019  9:00 AM Harsh Dudley MD [facility]     Appointment Notes for this encounter:   Data Unavailable    Health Maintenance Due   Topic Date Due     PREVENTIVE CARE VISIT  1979     DIABETIC FOOT EXAM  1979     ANNUAL REVIEW OF HM ORDERS  1979     EYE EXAM  1979     HIV SCREENING  11/18/1994     PNEUMOCOCCAL IMMUNIZATION 19-64 MEDIUM RISK (1 of 1 - PPSV23) 11/18/1998     DTAP/TDAP/TD IMMUNIZATION (1 - Tdap) 11/18/2004     MICROALBUMIN  11/30/2006     LIPID  03/14/2008     TSH W/FREE T4 REFLEX  09/05/2016     PHQ-2  01/01/2019     INFLUENZA VACCINE (1) 09/01/2019       Pre-visit planning reviewed items:       Patient preferred phone number: [phone]   Patient contacted. Questions completed:   Confirmed that the visit type and provider(s) are appropriate for the pt's reason for visit.  Confirmed date/time/location of appointment with patient.  Marked \"Pre-visit Planning Complete\" via Schedule activity.    "

## 2019-09-26 ENCOUNTER — OFFICE VISIT (OUTPATIENT)
Dept: PEDIATRICS | Facility: CLINIC | Age: 39
End: 2019-09-26
Payer: COMMERCIAL

## 2019-09-26 VITALS
OXYGEN SATURATION: 100 % | TEMPERATURE: 97.9 F | BODY MASS INDEX: 25.4 KG/M2 | RESPIRATION RATE: 18 BRPM | WEIGHT: 177.4 LBS | HEART RATE: 69 BPM | SYSTOLIC BLOOD PRESSURE: 112 MMHG | HEIGHT: 70 IN | DIASTOLIC BLOOD PRESSURE: 82 MMHG

## 2019-09-26 DIAGNOSIS — L97.909 DIABETIC LEG ULCER (H): ICD-10-CM

## 2019-09-26 DIAGNOSIS — E11.622 DIABETIC LEG ULCER (H): ICD-10-CM

## 2019-09-26 DIAGNOSIS — Z00.00 ROUTINE GENERAL MEDICAL EXAMINATION AT A HEALTH CARE FACILITY: Primary | ICD-10-CM

## 2019-09-26 DIAGNOSIS — Z53.9 ERRONEOUS ENCOUNTER--DISREGARD: Primary | ICD-10-CM

## 2019-09-26 DIAGNOSIS — E10.65 TYPE 1 DIABETES MELLITUS WITH HYPERGLYCEMIA (H): ICD-10-CM

## 2019-09-26 PROBLEM — R50.9 FEVER: Status: RESOLVED | Noted: 2019-08-06 | Resolved: 2019-09-26

## 2019-09-26 LAB
ALBUMIN SERPL-MCNC: 3.6 G/DL (ref 3.4–5)
ALP SERPL-CCNC: 112 U/L (ref 40–150)
ALT SERPL W P-5'-P-CCNC: 32 U/L (ref 0–70)
ANION GAP SERPL CALCULATED.3IONS-SCNC: 8 MMOL/L (ref 3–14)
AST SERPL W P-5'-P-CCNC: 11 U/L (ref 0–45)
BILIRUB SERPL-MCNC: 0.3 MG/DL (ref 0.2–1.3)
BUN SERPL-MCNC: 9 MG/DL (ref 7–30)
CALCIUM SERPL-MCNC: 8.7 MG/DL (ref 8.5–10.1)
CHLORIDE SERPL-SCNC: 107 MMOL/L (ref 94–109)
CHOLEST SERPL-MCNC: 123 MG/DL
CO2 SERPL-SCNC: 25 MMOL/L (ref 20–32)
CREAT SERPL-MCNC: 0.68 MG/DL (ref 0.66–1.25)
CREAT UR-MCNC: 294 MG/DL
GFR SERPL CREATININE-BSD FRML MDRD: >90 ML/MIN/{1.73_M2}
GLUCOSE SERPL-MCNC: 126 MG/DL (ref 70–99)
HBA1C MFR BLD: 10.5 % (ref 0–5.6)
HDLC SERPL-MCNC: 37 MG/DL
HIV 1+2 AB+HIV1 P24 AG SERPL QL IA: NONREACTIVE
LDLC SERPL CALC-MCNC: 69 MG/DL
MICROALBUMIN UR-MCNC: 26 MG/L
MICROALBUMIN/CREAT UR: 8.81 MG/G CR (ref 0–17)
NONHDLC SERPL-MCNC: 86 MG/DL
POTASSIUM SERPL-SCNC: 4 MMOL/L (ref 3.4–5.3)
PROT SERPL-MCNC: 7.2 G/DL (ref 6.8–8.8)
SODIUM SERPL-SCNC: 140 MMOL/L (ref 133–144)
TRIGL SERPL-MCNC: 84 MG/DL
TSH SERPL DL<=0.005 MIU/L-ACNC: 1.01 MU/L (ref 0.4–4)

## 2019-09-26 PROCEDURE — 80061 LIPID PANEL: CPT | Performed by: INTERNAL MEDICINE

## 2019-09-26 PROCEDURE — 83036 HEMOGLOBIN GLYCOSYLATED A1C: CPT | Performed by: INTERNAL MEDICINE

## 2019-09-26 PROCEDURE — 84443 ASSAY THYROID STIM HORMONE: CPT | Performed by: INTERNAL MEDICINE

## 2019-09-26 PROCEDURE — 90471 IMMUNIZATION ADMIN: CPT | Performed by: INTERNAL MEDICINE

## 2019-09-26 PROCEDURE — 87389 HIV-1 AG W/HIV-1&-2 AB AG IA: CPT | Performed by: INTERNAL MEDICINE

## 2019-09-26 PROCEDURE — 80053 COMPREHEN METABOLIC PANEL: CPT | Performed by: INTERNAL MEDICINE

## 2019-09-26 PROCEDURE — 90686 IIV4 VACC NO PRSV 0.5 ML IM: CPT | Performed by: INTERNAL MEDICINE

## 2019-09-26 PROCEDURE — 99213 OFFICE O/P EST LOW 20 MIN: CPT | Mod: 25 | Performed by: INTERNAL MEDICINE

## 2019-09-26 PROCEDURE — 99207 C FOOT EXAM  NO CHARGE: CPT | Mod: 25 | Performed by: INTERNAL MEDICINE

## 2019-09-26 PROCEDURE — 99385 PREV VISIT NEW AGE 18-39: CPT | Mod: 25 | Performed by: INTERNAL MEDICINE

## 2019-09-26 PROCEDURE — 82043 UR ALBUMIN QUANTITATIVE: CPT | Performed by: INTERNAL MEDICINE

## 2019-09-26 PROCEDURE — 36415 COLL VENOUS BLD VENIPUNCTURE: CPT | Performed by: INTERNAL MEDICINE

## 2019-09-26 RX ORDER — ATORVASTATIN CALCIUM 20 MG/1
20 TABLET, FILM COATED ORAL DAILY
Qty: 90 TABLET | Refills: 3 | Status: SHIPPED | OUTPATIENT
Start: 2019-09-26 | End: 2020-12-15

## 2019-09-26 ASSESSMENT — MIFFLIN-ST. JEOR: SCORE: 1725.93

## 2019-09-26 NOTE — PROGRESS NOTES
New Patient/Transfer of Care    Previous PCP or place of care: [facility]    Tdap UTD 1/2013 - wants flu    SUBJECTIVE:   CC: Sage Luna is an 38 year old male who presents for preventative health visit.     New Patient/Transfer of Care    Healthy Habits:     Taking medications regularly:  0    PHQ-2 Total Score: 2  History of Present Illness        Diabetes:   He presents for follow up of diabetes.  He is checking home blood glucose two times daily. He checks blood glucose before meals.  Blood glucose is sometimes over 200 and never under 70. He is aware of hypoglycemia symptoms including shakiness, dizziness and weakness. He is concerned about other.  He is not experiencing numbness or burning in feet, excessive thirst, blurry vision, weight changes or redness, sores or blisters on feet. The patient has had a diabetic eye exam in the last 12 months.     Diabetes Management Resources    He eats 0-1 servings of fruits and vegetables daily.He consumes 2 sweetened beverage(s) daily.  He is taking medications regularly.    Intermittent swelling of lower left leg - none now      Duration: off and on for a year    Description (location/character/radiation): left lower leg      History (similar episodes/previous evaluation): Ultrasound evaluation    Precipitating or alleviating factors: patient feels if blood sugar is better controlled swelling is better    Therapies tried and outcome: None       Chronic spot on left leg; tested in past by ultrasound.  No significant change overall in last 1 year, but some days looks better and worse.     AM sugars:  100-120.    Will then not check until after dinner.                   Today's PHQ-2 Score:   PHQ-2 ( 1999 [employer]) 9/25/2019   Q1: Little interest or pleasure in doing things 1   Q2: Feeling down, depressed or hopeless 1   PHQ-2 Score 2   Q1: Little interest or pleasure in doing things Several days   Q2: Feeling down, depressed or hopeless Several days   PHQ-2 Score 2        Abuse: Current or Past(Physical, Sexual or Emotional)- No  Do you feel safe in your environment? Yes    Social History     Tobacco Use     Smoking status: Never Smoker     Smokeless tobacco: Never Used   Substance Use Topics     Alcohol use: Yes     Comment: seldom     If you drink alcohol do you typically have >3 drinks per day or >7 drinks per week? No    No flowsheet data found.No flowsheet data found.    Last PSA: No results found for: PSA    Reviewed orders with patient. Reviewed health maintenance and updated orders accordingly - Yes  Lab work is in process  Labs reviewed in EPIC  BP Readings from Last 3 Encounters:   19 112/82   19 108/61   06/10/19 134/88    Wt Readings from Last 3 Encounters:   19 80.5 kg (177 lb 6.4 oz)   19 81.1 kg (178 lb 11.2 oz)   03/15/19 76.6 kg (168 lb 12.8 oz)                  Patient Active Problem List   Diagnosis     Type 1 diabetes mellitus (H)     Vitamin D deficiency     Past Surgical History:   Procedure Laterality Date     ARTHROSCOPY KNEE Right 3/25/2015    Procedure: ARTHROSCOPY KNEE;  Surgeon: Te Mcfarland MD;  Location:  OR     ARTHROSCOPY KNEE WITH LATERAL MENISCECTOMY  2014    Procedure: ARTHROSCOPY KNEE WITH LATERAL MENISCECTOMY;  Surgeon: Te Mcfarland MD;  Location: [facility] OR     OPEN REDUCTION INTERNAL FIXATION FEMUR DISTAL Right 2014    Procedure: OPEN REDUCTION INTERNAL FIXATION FEMUR DISTAL;  Surgeon: Te Mcfarland MD;  Location:  OR     REMOVE HARDWARE KNEE Right 3/25/2015    Procedure: REMOVE HARDWARE KNEE;  Surgeon: Te Mcfarland MD;  Location: [facility] OR       Social History     Tobacco Use     Smoking status: Never Smoker     Smokeless tobacco: Never Used   Substance Use Topics     Alcohol use: Yes     Comment: seldom     Family History   Problem Relation Age of Onset     Hypertension Father      Cancer Maternal Grandmother          , colon cancer     Cancer Paternal Grandfather          leukemia, still alive         Current Outpatient Medications   Medication Sig Dispense Refill     aspirin (ASA) 81 MG tablet Take 1 tablet (81 mg) by mouth daily 90 tablet 3     atorvastatin (LIPITOR) 20 MG tablet Take 1 tablet (20 mg) by mouth daily 90 tablet 3     insulin aspart (NOVOLOG FLEXPEN) 100 UNIT/ML pen Inject 8 Units Subcutaneous 3 times daily (with meals) In addition to sliding scale 8 mL 11     insulin aspart (NOVOLOG FLEXPEN) 100 UNIT/ML pen Sliding scale in addition to baseline 8 units with meals - usually uses 8-14 units total with each meal. 10 mL 11     insulin glargine (LANTUS PEN) 100 UNIT/ML pen Inject 40 Units Subcutaneous At Bedtime 15 mL 11     No Known Allergies  Recent Labs   Lab Test 09/26/19  0947 08/07/19  0710 08/06/19  2150 03/04/19  1117  09/05/14  0518 09/04/14  1302   A1C 10.5*  --  9.8*  --   --   --  11.0*   ALT  --  32  --  43  --  21  --    CR  --  0.73  --  0.94   < > 0.74 0.74   GFRESTIMATED  --  >90  --  >90   < > >90  Non  GFR Calc   >90  Non  GFR Calc     GFRESTBLACK  --  >90  --  >90   < > >90   GFR Calc   >90   GFR Calc     POTASSIUM  --  3.8  --  3.7   < > 3.9 4.0   TSH  --   --   --   --   --  0.51  --     < > = values in this interval not displayed.        Reviewed and updated as needed this visit by clinical staff  Tobacco  Allergies  Med Hx  Surg Hx  Fam Hx  Soc Hx        Reviewed and updated as needed this visit by Provider          Past Medical History:   Diagnosis Date     Diabetes (H)      Lyme disease       Past Surgical History:   Procedure Laterality Date     ARTHROSCOPY KNEE Right 3/25/2015    Procedure: ARTHROSCOPY KNEE;  Surgeon: Te Mcfarland MD;  Location:  OR     ARTHROSCOPY KNEE WITH LATERAL MENISCECTOMY  9/4/2014    Procedure: ARTHROSCOPY KNEE WITH LATERAL MENISCECTOMY;  Surgeon: Te Mcfarland MD;  Location:  OR     OPEN REDUCTION INTERNAL FIXATION FEMUR DISTAL Right  "9/4/2014    Procedure: OPEN REDUCTION INTERNAL FIXATION FEMUR DISTAL;  Surgeon: Te Mcfarland MD;  Location: [facility] OR     REMOVE HARDWARE KNEE Right 3/25/2015    Procedure: REMOVE HARDWARE KNEE;  Surgeon: Te Mcfarland MD;  Location:  OR       Review of Systems  CONSTITUTIONAL: NEGATIVE for fever, chills, change in weight  INTEGUMENTARY/SKIN: NEGATIVE for worrisome rashes, moles or lesions  EYES: NEGATIVE for vision changes or irritation  ENT: NEGATIVE for ear, mouth and throat problems  RESP: NEGATIVE for significant cough or SOB  CV: NEGATIVE for chest pain, palpitations or peripheral edema  GI: NEGATIVE for nausea, abdominal pain, heartburn, or change in bowel habits   male: negative for dysuria, hematuria, decreased urinary stream, erectile dysfunction, urethral discharge  MUSCULOSKELETAL: NEGATIVE for significant arthralgias or myalgia  NEURO: NEGATIVE for weakness, dizziness or paresthesias  PSYCHIATRIC: NEGATIVE for changes in mood or affect    OBJECTIVE:   /82 (BP Location: Right arm, Patient Position: Sitting, Cuff Size: Adult Regular)   Pulse 69   Temp 97.9  F (36.6  C) (Oral)   Resp 18   Ht 1.778 m (5' 10\")   Wt 80.5 kg (177 lb 6.4 oz)   SpO2 100%   BMI 25.45 kg/m      Physical Exam  GENERAL: healthy, alert and no distress  EYES: Eyes grossly normal to inspection, PERRL and conjunctivae and sclerae normal  HENT: ear canals and TM's normal, nose and mouth without ulcers or lesions  NECK: no adenopathy, no asymmetry, masses, or scars and thyroid normal to palpation  RESP: lungs clear to auscultation - no rales, rhonchi or wheezes  CV: regular rate and rhythm, normal S1 S2, no S3 or S4, no murmur, click or rub, no peripheral edema and peripheral pulses strong  ABDOMEN: soft, nontender, no hepatosplenomegaly, no masses and bowel sounds normal   (male): normal male genitalia without lesions or urethral discharge, no hernia  MS: no gross musculoskeletal defects noted, no edema  SKIN: " as above:     NEURO: Normal strength and tone, mentation intact and speech normal  PSYCH: mentation appears normal, affect normal/bright    Diagnostic Test Results:  Labs reviewed in Epic    ASSESSMENT/PLAN:   1. Routine general medical examination at a health care facility  Labs today.   - HIV Screening    2. Type 1 diabetes mellitus with hyperglycemia (H)  Control is likley poor, as he is not checking more than twice daily.  Make a 5x/ day log and follow up in 1 month.  Will uptaper his novolog based on mealtime and post meal sugars.  AM sugars sound at goal.   - Albumin Random Urine Quantitative with Creat Ratio  - Lipid panel reflex to direct LDL Fasting  - TSH WITH FREE T4 REFLEX  - FOOT EXAM  - Hemoglobin A1c  - Comprehensive metabolic panel  - aspirin (ASA) 81 MG tablet; Take 1 tablet (81 mg) by mouth daily  Dispense: 90 tablet; Refill: 3  - atorvastatin (LIPITOR) 20 MG tablet; Take 1 tablet (20 mg) by mouth daily  Dispense: 90 tablet; Refill: 3  - insulin glargine (LANTUS PEN) 100 UNIT/ML pen; Inject 40 Units Subcutaneous At Bedtime  Dispense: 15 mL; Refill: 11  - insulin aspart (NOVOLOG FLEXPEN) 100 UNIT/ML pen; Inject 8 Units Subcutaneous 3 times daily (with meals) In addition to sliding scale  Dispense: 8 mL; Refill: 11  - insulin aspart (NOVOLOG FLEXPEN) 100 UNIT/ML pen; Sliding scale in addition to baseline 8 units with meals - usually uses 8-14 units total with each meal.  Dispense: 10 mL; Refill: 11  - OFFICE/OUTPT VISIT,MINESH AGUILLON III    3. Diabetic leg ulcer (H)  Referred for wound care.   - WOUND CARE REFERRAL    COUNSELING:   Reviewed preventive health counseling, as reflected in patient instructions       Regular exercise       Healthy diet/nutrition       Vision screening       Hearing screening       Family planning       Safe sex practices/STD prevention       Colon cancer screening       Prostate cancer screening    Estimated body mass index is 25.45 kg/m  as calculated from the following:     "Height as of this encounter: 1.778 m (5' 10\").    Weight as of this encounter: 80.5 kg (177 lb 6.4 oz).     Weight management plan: Patient was referred to their PCP to discuss a diet and exercise plan.     reports that he has never smoked. He has never used smokeless tobacco.      Counseling Resources:  ATP IV Guidelines  Pooled Cohorts Equation Calculator  FRAX Risk Assessment  ICSI Preventive Guidelines  Dietary Guidelines for Americans, 2010  USDA's MyPlate  ASA Prophylaxis  Lung CA Screening    Harsh Dudley MD  [facility]  "

## 2019-09-26 NOTE — PATIENT INSTRUCTIONS
"  Flu shot today.    Lab work downstairs today.  Directions:  As you walk through the first floor, you'll see (on the right) first the pharmacy, then some bathrooms, then the \"Lab and Imaging\" area. Give them your name at the window there and wait for them to call you.     Begin a log of sugar 5 x per day for next 30 days.      Follow-up in 30 days, and we'll discuss changes.    Call wound care for your leg.    Begin 8 mg aspirin daily.    Begin 20 mg lipitor daily.     Preventive Health Recommendations  Male Ages 26 - 39    Yearly exam:             See your health care provider every year in order to  o   Review health changes.   o   Discuss preventive care.    o   Review your medicines if your doctor has prescribed any.    You should be tested each year for STDs (sexually transmitted diseases), if you re at risk.     After age 35, talk to your provider about cholesterol testing. If you are at risk for heart disease, have your cholesterol tested at least every 5 years.     If you are at risk for diabetes, you should have a diabetes test (fasting glucose).  Shots: Get a flu shot each year. Get a tetanus shot every 10 years.     Nutrition:    Eat at least 5 servings of fruits and vegetables daily.     Eat whole-grain bread, whole-wheat pasta and brown rice instead of white grains and rice.     Get adequate Calcium and Vitamin D.     Lifestyle    Exercise for at least 150 minutes a week (30 minutes a day, 5 days a week). This will help you control your weight and prevent disease.     Limit alcohol to one drink per day.     No smoking.     Wear sunscreen to prevent skin cancer.     See your dentist every six months for an exam and cleaning.     "

## 2019-10-02 ENCOUNTER — HOSPITAL ENCOUNTER (OUTPATIENT)
Dept: WOUND CARE | Facility: CLINIC | Age: 39
Discharge: HOME | End: 2019-10-02
Attending: INTERNAL MEDICINE | Admitting: INTERNAL MEDICINE
Payer: COMMERCIAL

## 2019-10-02 VITALS
RESPIRATION RATE: 16 BRPM | BODY MASS INDEX: 25.41 KG/M2 | DIASTOLIC BLOOD PRESSURE: 88 MMHG | HEART RATE: 99 BPM | HEIGHT: 70 IN | SYSTOLIC BLOOD PRESSURE: 123 MMHG | WEIGHT: 177.5 LBS | TEMPERATURE: 98.1 F

## 2019-10-02 DIAGNOSIS — L97.921 CHRONIC ULCER OF LEFT LEG, LIMITED TO BREAKDOWN OF SKIN (H): ICD-10-CM

## 2019-10-02 DIAGNOSIS — L97.922 ULCER OF LEFT LOWER EXTREMITY WITH FAT LAYER EXPOSED (H): Primary | ICD-10-CM

## 2019-10-02 PROBLEM — S72.309A CLOSED FRACTURE OF SHAFT OF FEMUR (H): Status: ACTIVE | Noted: 2019-10-02

## 2019-10-02 PROBLEM — F33.9 MAJOR DEPRESSIVE DISORDER, RECURRENT EPISODE (H): Status: ACTIVE | Noted: 2019-10-02

## 2019-10-02 PROBLEM — M25.469 EFFUSION OF LOWER LEG JOINT: Status: ACTIVE | Noted: 2019-10-02

## 2019-10-02 PROBLEM — L97.929: Status: ACTIVE | Noted: 2019-10-02

## 2019-10-02 PROCEDURE — 87015 SPECIMEN INFECT AGNT CONCNTJ: CPT | Performed by: SURGERY

## 2019-10-02 PROCEDURE — 88312 SPECIAL STAINS GROUP 1: CPT | Mod: TC | Performed by: SURGERY

## 2019-10-02 PROCEDURE — 87176 TISSUE HOMOGENIZATION CULTR: CPT | Performed by: SURGERY

## 2019-10-02 PROCEDURE — 87102 FUNGUS ISOLATION CULTURE: CPT | Performed by: SURGERY

## 2019-10-02 PROCEDURE — 87186 SC STD MICRODIL/AGAR DIL: CPT | Performed by: SURGERY

## 2019-10-02 PROCEDURE — 11042 DBRDMT SUBQ TIS 1ST 20SQCM/<: CPT

## 2019-10-02 PROCEDURE — 87206 SMEAR FLUORESCENT/ACID STAI: CPT | Performed by: SURGERY

## 2019-10-02 PROCEDURE — 87070 CULTURE OTHR SPECIMN AEROBIC: CPT | Performed by: SURGERY

## 2019-10-02 PROCEDURE — 11042 DBRDMT SUBQ TIS 1ST 20SQCM/<: CPT | Performed by: SURGERY

## 2019-10-02 PROCEDURE — 87077 CULTURE AEROBIC IDENTIFY: CPT | Performed by: SURGERY

## 2019-10-02 PROCEDURE — 88305 TISSUE EXAM BY PATHOLOGIST: CPT | Mod: TC | Performed by: SURGERY

## 2019-10-02 PROCEDURE — 87116 MYCOBACTERIA CULTURE: CPT | Performed by: SURGERY

## 2019-10-02 PROCEDURE — 99203 OFFICE O/P NEW LOW 30 MIN: CPT | Mod: 25 | Performed by: SURGERY

## 2019-10-02 PROCEDURE — 87075 CULTR BACTERIA EXCEPT BLOOD: CPT | Performed by: SURGERY

## 2019-10-02 PROCEDURE — G0463 HOSPITAL OUTPT CLINIC VISIT: HCPCS | Mod: 25

## 2019-10-02 RX ORDER — PEN NEEDLE, DIABETIC 29 G X1/2"
NEEDLE, DISPOSABLE MISCELLANEOUS
Refills: 0 | COMMUNITY
Start: 2019-08-12 | End: 2019-10-17

## 2019-10-02 RX ORDER — SYRINGE-NEEDLE,INSULIN,0.5 ML 27GX1/2"
SYRINGE, EMPTY DISPOSABLE MISCELLANEOUS
COMMUNITY
Start: 2019-08-10 | End: 2019-10-17

## 2019-10-02 ASSESSMENT — MIFFLIN-ST. JEOR: SCORE: 1726.38

## 2019-10-02 NOTE — PROGRESS NOTES
[facility] [place] Progress Note    Subject: Sage Luna consultation pain for evaluation of chronic non-resolving left lower extremity.  Patient is a 38-year-old male diagnosed with insulin-dependent diabetes at age 24, does not utilize insulin pump, most recent hemoglobin A1c relatively elevated to 10.5.  Chronic left lower extremity ulceration, states he was clearing dietrich thorn approximately 1 year ago, believes he was scratched, wound is not healed since then.  Denies fevers chills or sweats.  Eyes previous left or right lower externally ulcerations.  Denies myocardial infarctions, cerebrovascular accident.  States he has tingling in his feet though no motor or true sensory deficits.  History of Lyme's disease, resolved, most recent Lyme's test was negative.  Not immunosuppressed.  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  Does not utilize tobacco or alcohol.  History of hypothyroidism.  The complete review of systems otherwise negative.    PMH:   Past Medical History:   Diagnosis Date     Diabetes (H)      Lyme disease      Patient Active Problem List   Diagnosis     Type 1 diabetes mellitus (H)     Vitamin D deficiency     Closed fracture of shaft of femur (H)     Effusion of lower leg joint     Major depressive disorder, recurrent episode (H)     Osteoporosis     Social Hx:   Social History     Socioeconomic History     Marital status:      Spouse name: Not on file     Number of children: 1     Years of education: Not on file     Highest education level: Not on file   Occupational History     Not on file   Social Needs     Financial resource strain: Not on file     Food insecurity:     Worry: Not on file     Inability: Not on file     Transportation needs:     Medical: Not on file     Non-medical: Not on file   Tobacco Use     Smoking status: Never Smoker     Smokeless tobacco: Never Used   Substance and Sexual Activity     Alcohol use: Yes     Comment: seldom     Drug use: No     Sexual  "activity: Yes     Partners: Female   Lifestyle     Physical activity:     Days per week: Not on file     Minutes per session: Not on file     Stress: Not on file   Relationships     Social connections:     Talks on phone: Not on file     Gets together: Not on file     Attends [employer] service: Not on file     Active member of club or organization: Not on file     Attends meetings of clubs or organizations: Not on file     Relationship status: Not on file     Intimate partner violence:     Fear of current or ex partner: Not on file     Emotionally abused: Not on file     Physically abused: Not on file     Forced sexual activity: Not on file   Other Topics Concern     Not on file   Social History Narrative     Not on file       Surgical Hx:   Past Surgical History:   Procedure Laterality Date     ARTHROSCOPY KNEE Right 3/25/2015    Procedure: ARTHROSCOPY KNEE;  Surgeon: Te Mcfarland MD;  Location: [facility] OR     ARTHROSCOPY KNEE WITH LATERAL MENISCECTOMY  9/4/2014    Procedure: ARTHROSCOPY KNEE WITH LATERAL MENISCECTOMY;  Surgeon: Te Mcfarland MD;  Location: [facility] OR     OPEN REDUCTION INTERNAL FIXATION FEMUR DISTAL Right 9/4/2014    Procedure: OPEN REDUCTION INTERNAL FIXATION FEMUR DISTAL;  Surgeon: Te Mcfarland MD;  Location: [facility] OR     REMOVE HARDWARE KNEE Right 3/25/2015    Procedure: REMOVE HARDWARE KNEE;  Surgeon: Te Mcfarland MD;  Location: [facility] OR       Allergies:  No Known Allergies    Medications:   Current Outpatient Medications   Medication     aspirin (ASA) 81 MG tablet     atorvastatin (LIPITOR) 20 MG tablet     BD INSULIN SYRINGE U/F 31G X 5/16\" 0.5 ML miscellaneous     blood glucose ([employer] TEST STRIPS) test strip     insulin aspart (NOVOLOG FLEXPEN) 100 UNIT/ML pen     insulin aspart (NOVOLOG FLEXPEN) 100 UNIT/ML pen     insulin glargine (LANTUS PEN) 100 UNIT/ML pen     insulin syringe-needle U-100 (BD INSULIN SYRINGE U/F) 31G X 5/16\" 0.5 ML miscellaneous     No current " "facility-administered medications for this encounter.        Labs:   Recent Labs   Lab Test 09/26/19  0947 08/07/19  0710  01/01/13  0910   ALBUMIN 3.6 2.8*   < >  --    HGB  --  12.1*   < > 13.7   WBC  --  8.2   < > 4.8   A1C 10.5*  --    < >  --    CRP  --   --   --  10.0*    < > = values in this interval not displayed.     Nutrition requirements were discussed with patient today.  Objective:  /88 (BP Location: Right arm)   Pulse 99   Temp 98.1  F (36.7  C) (Oral)   Resp 16   Ht 1.778 m (5' 10\")   Wt 80.5 kg (177 lb 8 oz)   BMI 25.47 kg/m    Wound (used by OP [facility] only) 10/02/19 0925 Left lower;anterior leg unspecified (Active)   Length (cm) 1.5 10/2/2019  9:00 AM   Width (cm) 1.3 10/2/2019  9:00 AM   Depth (cm) 0 10/2/2019  9:00 AM   Wound (cm^2) 1.95 cm^2 10/2/2019  9:00 AM   Wound Volume (cm^3) 0 cm^3 10/2/2019  9:00 AM   Dressing Appearance moist drainage 10/2/2019  9:00 AM   Drainage Characteristics/Odor serosanguineous 10/2/2019  9:00 AM   Drainage Amount moderate 10/2/2019  9:00 AM        General:  Patient is alert and orientated, no acute distress.  Conversant.  Abdomen mildly obese, soft, nontender, no masses identified.  Groin evaluation, no lymphadenopathy, palpable femoral pulses.  Palpable bilateral pedal pulses.  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  1% lidocaine injected for obtaining full-thickness biopsies, aerobic and anaerobic cultures, fungal culture no bone or tendon exposure.  Markedly overgrown and thickened toenails, debrided bilaterally.    Vascular: Pedal pulses intact.    Procedure:   Patient was determined to be capable of making their own medical decisions and informed consent was obtained, topical anesthetic of 4% lidocaine was applied, debridement was performed.  Knife was used to debride ulcer down to and including subcutaneous tissue.  Full-thickness biopsies, aerobic, anaerobic, fungal cultures obtained.  " Bleeding controlled with light pressure. Patient tolerated procedure well.    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  Barriers to healing include: Diabetes, nutrition. Patient education provided on each.   Plan:  We will dress the wounds with Plurogel, EdemaWear, cover with Xeroform, change daily, irrigate with Prontosan.  Needs consistent nail cares, professional nail care provider list provided.  Vitamin D supplementation and Waylon 1 pack twice daily until wound healed.  Patient will return to the clinic in 1-2 weeks time.  To discuss results of biopsies and cultures.    Christos Tovar MD on 10/2/2019 at 10:11 AM

## 2019-10-02 NOTE — ADDENDUM NOTE
Encounter addended by: Katy Mckenna RN on: 10/2/2019 11:27 AM   Actions taken: Visit diagnoses modified, Order list changed, Diagnosis association updated

## 2019-10-02 NOTE — ADDENDUM NOTE
Encounter addended by: Katy Mckenna RN on: 10/2/2019 11:05 AM   Actions taken: Flowsheet accepted, Problem List modified, Episode edited, Visit Navigator Flowsheet section accepted, Charge Capture section accepted

## 2019-10-02 NOTE — DISCHARGE INSTRUCTIONS
"      [facility]  [address] [facility] [address] [place] [id number]  Appointment Phone [phone] Nurse Advisors [phone]    Sage Luna      1980  Please add in Vitamin D3 Vitamin 5,000 international units per day.  Please add in 1 packet of Waylon Supplement TWICE a day until your next appointment     Wound Dressing Change:Left Lateral Lower Leg  Cleanse wound and surrounding skin with: saline or wound cleanser  Cover wound with plurogel on xeroform, gauze and roll gauze  Change dressing daily    Please order \"EdemaWear Open Toe\"  from [employer] Size: jaki Tovar M.D.. October 2, 2019    Call us at [phone] if you have any questions about your wounds, have redness or swelling around your wound, have a fever of 101 or greater or if you have any other problems or concerns. We answer the phone Monday through Friday 8 am to 4 pm, please leave a message as we check the voicemail frequently throughout the day.     Follow up with Provider - 2 weeks     ROUTINE FOOT CARE    [employer]   $55 nails - $10 calluses  [phone]  (Travels to your home) [employer] Feet - $40  [phone]  [email or web address] for locations or they can come to your home   [employer]  [phone]  [facility] [employer]  [phone]  (Travels to your home)   Aaliyah Noble, DPM  [address], [place] [id number] [phone] Jaskaran Harris DPM  [address] [address] [address] [phone]   [facility]  [address] [place] [address] [phone] [facility]   [phone]  [facility]   [facility]  [phone]  [place] [facility]  (does not take BCBS) [facility]   [phone]             "

## 2019-10-02 NOTE — ADDENDUM NOTE
Encounter addended by: Katy Mckenna RN on: 10/2/2019 10:38 AM   Actions taken: Order list changed, Multistep and multistep collection tasks completed

## 2019-10-02 NOTE — PROGRESS NOTES
Patient arrived for wound care visit. Certified Wound Care Nurse time spent evaluating patient record, completed a full evaluation and documented wound(s) & ángela-wound skin; provided recommendation based on treatment plan. Applied dressing, reviewed discharge instructions, patient education, and discussed plan of care with appropriate medical team staff members and patient and/or family members.

## 2019-10-04 ENCOUNTER — TELEPHONE (OUTPATIENT)
Dept: WOUND CARE | Facility: CLINIC | Age: 39
End: 2019-10-04

## 2019-10-04 DIAGNOSIS — L97.922 ULCER OF LEFT LOWER EXTREMITY WITH FAT LAYER EXPOSED (H): Primary | ICD-10-CM

## 2019-10-04 LAB
BACTERIA SPEC CULT: ABNORMAL
SPECIMEN SOURCE: ABNORMAL

## 2019-10-04 RX ORDER — DOXYCYCLINE 100 MG/1
100 CAPSULE ORAL 2 TIMES DAILY
Qty: 28 CAPSULE | Refills: 0 | Status: SHIPPED | OUTPATIENT
Start: 2019-10-04 | End: 2019-10-14

## 2019-10-04 NOTE — TELEPHONE ENCOUNTER
Dr. Tovar gave orders for antibiotics off culture results attempted to reach patient. Left message for him to call the clinic

## 2019-10-05 LAB
ACID FAST STN SPEC QL: NORMAL
ACID FAST STN SPEC QL: NORMAL
SPECIMEN SOURCE: NORMAL

## 2019-10-08 LAB — COPATH REPORT: NORMAL

## 2019-10-09 LAB
BACTERIA SPEC CULT: NORMAL
SPECIMEN SOURCE: NORMAL

## 2019-10-14 ENCOUNTER — HOSPITAL ENCOUNTER (OUTPATIENT)
Dept: WOUND CARE | Facility: CLINIC | Age: 39
Discharge: HOME | End: 2019-10-14
Attending: SURGERY | Admitting: SURGERY
Payer: COMMERCIAL

## 2019-10-14 VITALS
TEMPERATURE: 96.9 F | HEART RATE: 105 BPM | RESPIRATION RATE: 16 BRPM | DIASTOLIC BLOOD PRESSURE: 85 MMHG | SYSTOLIC BLOOD PRESSURE: 110 MMHG

## 2019-10-14 DIAGNOSIS — L97.922 ULCER OF LEFT LOWER EXTREMITY WITH FAT LAYER EXPOSED (H): Primary | ICD-10-CM

## 2019-10-14 PROCEDURE — G0463 HOSPITAL OUTPT CLINIC VISIT: HCPCS

## 2019-10-14 RX ORDER — DIOSMIN COMPLEX NO.1 630 MG
1 TABLET ORAL DAILY
Qty: 90 TABLET | Refills: 3 | Status: SHIPPED | OUTPATIENT
Start: 2019-10-14 | End: 2023-11-03

## 2019-10-14 RX ORDER — EMOLLIENT COMBINATION NO.32
1 EMULSION, EXTENDED RELEASE TOPICAL 2 TIMES DAILY
Qty: 225 G | Refills: 11 | Status: SHIPPED | OUTPATIENT
Start: 2019-10-14 | End: 2020-03-03

## 2019-10-14 NOTE — DISCHARGE INSTRUCTIONS
[facility]  [address] [facility] [address] [place] [address]  Appointment Phone [phone] Nurse Advisors [phone]    Sage AKINS Cheryl      1980  Your wound is Healed!! Dressings are no longer required.   ~Take Vasculera one tablet daily to help decrease inflammation of your chronic venous disease.  ~Apply EpiCeram helps repair and heal the skin barrier through a unique mechanism of action not commonly found in other medications. It contains the skin s natural level of essential lipids: ceramides, cholesterol and free fatty acids, which are reduced in patients with eczema and atopic dermatitis.  EpiCeram is steroid-free, fragrance-free, noncomedogenic,paraben-free, and propylene glycol-free.   Available in a 90g tube and 225g airless pump  Please call [phone] to get both these prescriptions    Compression:Apply Edemawear and wear daily for the rest of your life.   EMERALD Tovar M.D.. October 14, 2019    Once the ulcer has healed, you will need to use compression stocking to help the pumping action in your veins. The stockings will also reduce swelling.  Home care  Follow these tips when caring for yourself at home:    Use any special compression dressings or stockings as directed.    Walk regularly. This helps the blood flow better in your legs.    Maintain a healthy weight. If you are overweight, talk with your provider about a weight loss program.    If you smoke, quit smoking. This will ease your symptoms and lower the chance that the disease will get worse. Join a stop-smoking program or ask your provider for help in quitting.    Check your feet and legs for skin breaks or color changes. Report these to your provider. This could be an early sign of an ulcer.    When standing, shift your weight from one leg to the other.    When sitting for long periods, put your feet up. Move your feet and ankles often to get your calf muscles moving. Get up and walk from time  to time.

## 2019-10-14 NOTE — PROGRESS NOTES
Patient arrived for wound care visit. Certified Wound Care Nurse time spent evaluating patient record, completed a full evaluation and documented wound(s) & ángela-wound skin; provided recommendation based on treatment plan. Applied dressing, reviewed discharge instructions, patient education, and discussed plan of care with appropriate medical team staff members and patient and/or family members.         Patient given sample of vasculera [id number] and Epiceram [id number]

## 2019-10-14 NOTE — PROGRESS NOTES
[facility] [place] Progress Note    Subject: Sage Luna type I diabetic, follows up after October 2, 2019 evaluation and multiple biopsies.  Biopsies demonstrate no evidence of necrobiosis diabeticorum, no malignancy, no vasculitis.  Culture had staph aureus.  Preleminary fungal culture negative.  Denies pain.    Patient Active Problem List   Diagnosis     Type 1 diabetes mellitus (H)     Vitamin D deficiency     Closed fracture of shaft of femur (H)     Effusion of lower leg joint     Major depressive disorder, recurrent episode (H)     Osteoporosis     Ulcer of left lower extremity with fat layer exposed (H)     Past Medical History:   Diagnosis Date     Diabetes (H)      Lyme disease      Exam:  /85 (BP Location: Right arm)   Pulse 105   Temp 96.9  F (36.1  C) (Temporal)   Resp 16      General appearance is nondistressed, conversant, alert and oriented x3.  Markedly improved skin thickening and subdermal edema left lower extremity with resolution of ulcerations, biopsy sites near completely resolved.  Palpable left pedal pulse.  EpiCeram cream applied to skin.        Impression: Closed left lower extremity lesion with utilization of edema wear and Plurogel at 12 days.    Plan: Continue EdemaWear 24 hours/day, application of EpiCeram cream twice a day lifelong.  Follow-up in 3 months for reevaluation..  Skin cares daily and subdermal lymphatic dysfunction and and edema control ideally would result in minimal recidivism.      Christos Tovar MD on 10/14/2019 at 12:10 PM

## 2019-10-17 DIAGNOSIS — E10.65 TYPE 1 DIABETES MELLITUS WITH HYPERGLYCEMIA (H): Primary | ICD-10-CM

## 2019-10-17 RX ORDER — SYRINGE-NEEDLE,INSULIN,0.5 ML 27GX1/2"
SYRINGE, EMPTY DISPOSABLE MISCELLANEOUS
Qty: 100 EACH | Refills: 1 | Status: SHIPPED | OUTPATIENT
Start: 2019-10-17 | End: 2019-10-17

## 2019-10-17 NOTE — TELEPHONE ENCOUNTER
Prescription approved per [facility] Refill Protocol.    Cheri Busch RN BSN   [facility]

## 2019-10-17 NOTE — TELEPHONE ENCOUNTER
Reason for call:  Other   Patient called regarding (reason for call): prescription  Additional comments: Pt calling to request a fast track refill approval for insulin syringe-needle. Pt got Novolog flexpen and Lantus pen but not needles for his insulin.     Pt preferred pharmacy is [facility] in [place].    Please, call pt for further questions.     Phone number to reach patient:  Home number on file [phone] (home)    Best Time:  ANY TIME    Can we leave a detailed message on this number?  YES

## 2019-10-21 NOTE — PROGRESS NOTES
Pre-Visit Planning     Future Appointments   Date Time Provider Department Center   10/25/2019 11:00 AM Harsh Dudley MD [facility]   1/6/2020 11:45 AM Christos Tovar MD [facility]     Appointment Notes for this encounter:   f/u from visit on 9/26    Questionnaires Reviewed/Assigned  Additional questionnaires assigned    Patient preferred phone number: [phone]    Patient contact not needed.

## 2019-10-24 ASSESSMENT — PATIENT HEALTH QUESTIONNAIRE - PHQ9
SUM OF ALL RESPONSES TO PHQ QUESTIONS 1-9: 0
10. IF YOU CHECKED OFF ANY PROBLEMS, HOW DIFFICULT HAVE THESE PROBLEMS MADE IT FOR YOU TO DO YOUR WORK, TAKE CARE OF THINGS AT HOME, OR GET ALONG WITH OTHER PEOPLE: NOT DIFFICULT AT ALL
SUM OF ALL RESPONSES TO PHQ QUESTIONS 1-9: 0

## 2019-10-25 ENCOUNTER — OFFICE VISIT (OUTPATIENT)
Dept: PEDIATRICS | Facility: CLINIC | Age: 39
End: 2019-10-25
Payer: COMMERCIAL

## 2019-10-25 VITALS
TEMPERATURE: 98.4 F | SYSTOLIC BLOOD PRESSURE: 112 MMHG | HEART RATE: 104 BPM | HEIGHT: 70 IN | BODY MASS INDEX: 24.97 KG/M2 | DIASTOLIC BLOOD PRESSURE: 82 MMHG | WEIGHT: 174.4 LBS | OXYGEN SATURATION: 98 %

## 2019-10-25 DIAGNOSIS — E10.65 TYPE 1 DIABETES MELLITUS WITH HYPERGLYCEMIA (H): Primary | ICD-10-CM

## 2019-10-25 LAB — HBA1C MFR BLD: 10.1 % (ref 0–5.6)

## 2019-10-25 PROCEDURE — 83036 HEMOGLOBIN GLYCOSYLATED A1C: CPT | Performed by: INTERNAL MEDICINE

## 2019-10-25 PROCEDURE — 36415 COLL VENOUS BLD VENIPUNCTURE: CPT | Performed by: INTERNAL MEDICINE

## 2019-10-25 PROCEDURE — 99214 OFFICE O/P EST MOD 30 MIN: CPT | Performed by: INTERNAL MEDICINE

## 2019-10-25 ASSESSMENT — PATIENT HEALTH QUESTIONNAIRE - PHQ9: SUM OF ALL RESPONSES TO PHQ QUESTIONS 1-9: 0

## 2019-10-25 ASSESSMENT — MIFFLIN-ST. JEOR: SCORE: 1712.32

## 2019-10-25 NOTE — PROGRESS NOTES
Subjective     Sage Luna is a 38 year old male who presents to clinic today for the following health issues:    HPI     Diabetes Follow-up      How often are you checking your blood sugar? Three times daily    What time of day are you checking your blood sugars (select all that apply)?  Before and after meals    Have you had any blood sugars above 200?  No    Have you had any blood sugars below 70?  No    What symptoms do you notice when your blood sugar is low?  Weak, when blood sugars are too low     What concerns do you have today about your diabetes?  Ulcers on left chin      Do you have any of these symptoms? (Select all that apply)  No numbness or tingling in feet.  No redness, sores or blisters on feet.  No complaints of excessive thirst.  No reports of blurry vision.  No significant changes to weight.     Have you had a diabetic eye exam in the last 12 months? Yes- Date of last eye exam: August 2019    BP Readings from Last 2 Encounters:   10/25/19 112/82   10/14/19 110/85     Hemoglobin A1C (%)   Date Value   09/26/2019 10.5 (H)   08/06/2019 9.8 (H)     LDL Cholesterol Calculated (mg/dL)   Date Value   09/26/2019 69   03/14/2007 86       Diabetes Management Resources      How many servings of fruits and vegetables do you eat daily?  0-1    On average, how many sweetened beverages do you drink each day (soda, juice, sweet tea, etc)?   2    How many days per week do you miss taking your medication? 0    Has been using wound care cream, from wound clinic.  Wears compression socks.  Overall thinks that it is healing nicely.    With respect to diabetes mellitus; has been having less rise later in the day.    AM sugars:  .     Using novolog 8 + sliding scale; usually 8-10, then 10, then 12-15 with dinner.    Using lantus 40 units at night.     Patient Active Problem List   Diagnosis     Type 1 diabetes mellitus (H)     Vitamin D deficiency     Closed fracture of shaft of femur (H)     Effusion of  lower leg joint     Major depressive disorder, recurrent episode (H)     Osteoporosis     Ulcer of left lower extremity with fat layer exposed (H)     Past Surgical History:   Procedure Laterality Date     ARTHROSCOPY KNEE Right 3/25/2015    Procedure: ARTHROSCOPY KNEE;  Surgeon: Te Mcfarland MD;  Location: [facility] OR     ARTHROSCOPY KNEE WITH LATERAL MENISCECTOMY  2014    Procedure: ARTHROSCOPY KNEE WITH LATERAL MENISCECTOMY;  Surgeon: Te Mcfarland MD;  Location: [facility] OR     OPEN REDUCTION INTERNAL FIXATION FEMUR DISTAL Right 2014    Procedure: OPEN REDUCTION INTERNAL FIXATION FEMUR DISTAL;  Surgeon: Te Mcfarland MD;  Location: [facility] OR     REMOVE HARDWARE KNEE Right 3/25/2015    Procedure: REMOVE HARDWARE KNEE;  Surgeon: Te Mcfarland MD;  Location: [facility] OR       Social History     Tobacco Use     Smoking status: Never Smoker     Smokeless tobacco: Never Used   Substance Use Topics     Alcohol use: Yes     Comment: seldom     Family History   Problem Relation Age of Onset     Hypertension Father      Cancer Maternal Grandmother          , colon cancer     Cancer Paternal Grandfather         leukemia, still alive         Current Outpatient Medications   Medication Sig Dispense Refill     aspirin (ASA) 81 MG tablet Take 1 tablet (81 mg) by mouth daily 90 tablet 3     atorvastatin (LIPITOR) 20 MG tablet Take 1 tablet (20 mg) by mouth daily 90 tablet 3     blood glucose ([employer] TEST STRIPS) test strip Inject 100 each into the skin       Dermatological Products, Misc. (EPICERAM) EMUL Externally apply 1 Applicatorful topically 2 times daily 225 g 11     Dietary Management Product (VASCULERA) TABS Take 1 tablet by mouth daily 90 tablet 3     insulin aspart (NOVOLOG FLEXPEN) 100 UNIT/ML pen Inject 8 Units Subcutaneous 3 times daily (with meals) In addition to sliding scale 8 mL 11     insulin aspart (NOVOLOG FLEXPEN) 100 UNIT/ML pen Sliding scale in addition to baseline 8 units with meals -  usually uses 8-14 units total with each meal. 10 mL 11     insulin glargine (LANTUS PEN) 100 UNIT/ML pen Inject 40 Units Subcutaneous At Bedtime 15 mL 11     insulin pen needle (31G X 6 MM) 31G X 6 MM miscellaneous Use 4 pen needles daily or as directed. 400 each 11     order for DME [facility] Phone [phone] Fax [phone]    Primary Dressing Xeroform sheet   Qty 5  Secondary Dressing Prontosan wound irrigation Qty 1 bottle  Secondary Dressing 4x4 gauze loaf Qty 1  Secondary Dressing 4' roll gauze Qty 30  Secondary Dressing 2' medipore tape Qty  1  Length of Need: 1 month  Frequency of dressing change: daily 30 days 0     No Known Allergies  Recent Labs   Lab Test 09/26/19  0947 08/07/19  0710 08/06/19  2150 03/04/19  1117  09/05/14  0518  09/04/14  1302   A1C 10.5*  --  9.8*  --   --   --   --  11.0*   LDL 69  --   --   --   --   --   --   --    HDL 37*  --   --   --   --   --   --   --    TRIG 84  --   --   --   --   --   --   --    ALT 32 32  --  43  --  21   < >  --    CR 0.68 0.73  --  0.94   < > 0.74  --  0.74   GFRESTIMATED >90 >90  --  >90   < > >90  Non  GFR Calc    --  >90  Non  GFR Calc     GFRESTBLACK >90 >90  --  >90   < > >90  African American GFR Calc    --  >90   GFR Calc     POTASSIUM 4.0 3.8  --  3.7   < > 3.9  --  4.0   TSH 1.01  --   --   --   --  0.51  --   --     < > = values in this interval not displayed.      BP Readings from Last 3 Encounters:   10/25/19 112/82   10/14/19 110/85   10/02/19 123/88    Wt Readings from Last 3 Encounters:   10/25/19 79.1 kg (174 lb 6.4 oz)   10/02/19 80.5 kg (177 lb 8 oz)   09/26/19 80.5 kg (177 lb 6.4 oz)                    Reviewed and updated as needed this visit by Provider         Review of Systems   ROS COMP: CONSTITUTIONAL: NEGATIVE for fever, chills, change in weight  INTEGUMENTARY/SKIN: NEGATIVE for worrisome rashes, moles or lesions  EYES: NEGATIVE for vision changes or  "irritation  ENT/MOUTH: NEGATIVE for ear, mouth and throat problems  RESP: NEGATIVE for significant cough or SOB  BREAST: NEGATIVE for masses, tenderness or discharge  CV: NEGATIVE for chest pain, palpitations or peripheral edema  GI: NEGATIVE for nausea, abdominal pain, heartburn, or change in bowel habits  : NEGATIVE for frequency, dysuria, or hematuria  MUSCULOSKELETAL: NEGATIVE for significant arthralgias or myalgia  NEURO: NEGATIVE for weakness, dizziness or paresthesias  ENDOCRINE: NEGATIVE for temperature intolerance, skin/hair changes  HEME: NEGATIVE for bleeding problems  PSYCHIATRIC: NEGATIVE for changes in mood or affect      Objective    /82 (BP Location: Right arm, Patient Position: Sitting, Cuff Size: Adult Regular)   Pulse 104   Temp 98.4  F (36.9  C) (Oral)   Ht 1.778 m (5' 10\")   Wt 79.1 kg (174 lb 6.4 oz)   SpO2 98%   BMI 25.02 kg/m    Body mass index is 25.02 kg/m .  Physical Exam   GENERAL: healthy, alert and no distress  EYES: Eyes grossly normal to inspection, PERRL and conjunctivae and sclerae normal  HENT: ear canals and TM's normal, nose and mouth without ulcers or lesions  NECK: no adenopathy, no asymmetry, masses, or scars and thyroid normal to palpation  RESP: lungs clear to auscultation - no rales, rhonchi or wheezes  CV: regular rate and rhythm, normal S1 S2, no S3 or S4, no murmur, click or rub, no peripheral edema and peripheral pulses strong  ABDOMEN: soft, nontender, no hepatosplenomegaly, no masses and bowel sounds normal  MS: no gross musculoskeletal defects noted, no edema  SKIN: no suspicious lesions or rashes  NEURO: Normal strength and tone, mentation intact and speech normal  PSYCH: mentation appears normal, affect normal/bright         Diagnostic Test Results:  Labs reviewed in Epic        Assessment & Plan       ICD-10-CM    1. Type 1 diabetes mellitus with hyperglycemia (H) E10.65 Hemoglobin A1c     **A1C FUTURE 1yr      Will check today; don't suspect it has " "normalized, but will recheck in 3 months to ensure at goal.  Patient being much more diligent with respect to his diabetes mellitus.   BMI:   Estimated body mass index is 25.02 kg/m  as calculated from the following:    Height as of this encounter: 1.778 m (5' 10\").    Weight as of this encounter: 79.1 kg (174 lb 6.4 oz).   Weight management plan: Patient was referred to their PCP to discuss a diet and exercise plan.        Patient Instructions   Lab work downstairs today.  Directions:  As you walk through the first floor, you'll see (on the right) first the pharmacy, then some bathrooms, then the \"Lab and Imaging\" area. Give them your name at the window there and wait for them to call you.     If your diabetes blood test (A1c) is still high, I'd like to check again in January:.  You can do this by calling  or using your [email or web address] account, if you have it.    No change in your meds for now.    Harsh Dudley MD  Internal Medicine and Pediatrics         Return in about 3 months (around 1/25/2020).    Harsh Dudley MD  [facility]      "

## 2019-10-25 NOTE — PATIENT INSTRUCTIONS
"Lab work downstairs today.  Directions:  As you walk through the first floor, you'll see (on the right) first the pharmacy, then some bathrooms, then the \"Lab and Imaging\" area. Give them your name at the window there and wait for them to call you.     If your diabetes blood test (A1c) is still high, I'd like to check again in January:.  You can do this by calling  or using your [employer] account, if you have it.    No change in your meds for now.    Harsh Dudley MD  Internal Medicine and Pediatrics     "

## 2019-10-30 LAB
FUNGUS SPEC CULT: NORMAL
SPECIMEN SOURCE: NORMAL

## 2019-11-07 ENCOUNTER — HEALTH MAINTENANCE LETTER (OUTPATIENT)
Age: 39
End: 2019-11-07

## 2019-11-29 LAB
MYCOBACTERIUM SPEC CULT: NORMAL
MYCOBACTERIUM SPEC CULT: NORMAL
SPECIMEN SOURCE: NORMAL

## 2020-01-06 ENCOUNTER — HOSPITAL ENCOUNTER (OUTPATIENT)
Dept: WOUND CARE | Facility: CLINIC | Age: 40
Discharge: HOME | End: 2020-01-06
Attending: SURGERY | Admitting: SURGERY
Payer: COMMERCIAL

## 2020-01-06 VITALS
TEMPERATURE: 98 F | SYSTOLIC BLOOD PRESSURE: 119 MMHG | HEART RATE: 107 BPM | RESPIRATION RATE: 18 BRPM | DIASTOLIC BLOOD PRESSURE: 83 MMHG

## 2020-01-06 DIAGNOSIS — L97.922 ULCER OF LEFT LOWER EXTREMITY WITH FAT LAYER EXPOSED (H): ICD-10-CM

## 2020-01-06 PROCEDURE — 97597 DBRDMT OPN WND 1ST 20 CM/<: CPT

## 2020-01-06 PROCEDURE — A6197 ALGINATE DRSG >16 <=48 SQ IN: HCPCS

## 2020-01-06 PROCEDURE — 97597 DBRDMT OPN WND 1ST 20 CM/<: CPT | Performed by: SURGERY

## 2020-01-06 NOTE — PROGRESS NOTES
[facility] [place] Progress Note    Subject: Sage Luna diabetic, history of dietrich thorn injury September 2018, being treated with Plurogel.  Recent visit October 14, 2019, medical record reviewed.    Patient Active Problem List   Diagnosis     Type 1 diabetes mellitus (H)     Vitamin D deficiency     Closed fracture of shaft of femur (H)     Effusion of lower leg joint     Major depressive disorder, recurrent episode (H)     Osteoporosis     Ulcer of left lower extremity with fat layer exposed (H)     Past Medical History:   Diagnosis Date     Diabetes (H)      Lyme disease      Exam:  /83   Pulse 107   Temp 98  F (36.7  C) (Temporal)   Resp 18   Wound (used by OP [facility] only) 10/02/19 0925 Left lower;anterior leg unspecified (Active)   Length (cm) 0.8 1/6/2020 12:00 PM   Width (cm) 1.2 1/6/2020 12:00 PM   Depth (cm) 0.1 1/6/2020 12:00 PM   Wound (cm^2) 0.96 cm^2 1/6/2020 12:00 PM   Wound Volume (cm^3) 0.1 cm^3 1/6/2020 12:00 PM   Wound healing % 50.77 1/6/2020 12:00 PM   Dressing Appearance moist drainage 1/6/2020 12:00 PM   Drainage Characteristics/Odor serosanguineous 1/6/2020 12:00 PM   Drainage Amount moderate 1/6/2020 12:00 PM   Thickness/Stage full thickness 1/6/2020 12:00 PM   Base red/granulating 1/6/2020 12:00 PM   Periwound intact 1/6/2020 12:00 PM   Periwound Temperature warm 1/6/2020 12:00 PM   Periwound Skin Turgor soft 1/6/2020 12:00 PM   Edges open 1/6/2020 12:00 PM   Care, Wound debrided 1/6/2020 12:00 PM     39-year-old male, see photodocumentation, compared to October 14, 2019 and October 2, 2019.  No foul odor, no undermining, no purulent anus.  Wound was selectively debrided with a curette knife, had previously been soaked with 4% lidocaine, all biofilm removed, left lower extremity.  Intact pedal pulse.        Impression: Insulin-dependent diabetes, left lower extremity ulceration, progressive improvement with utilization of Pluragel    Plan: We will dress the wounds  with Plurogel on Aquasol to de-epithelialized area, EpiCeram cream to remainder of skin changes.  Patient will return to the clinic in 4-6 weeks time    Christos Tovar MD on 1/6/2020 at 1:24 PM

## 2020-01-06 NOTE — DISCHARGE INSTRUCTIONS
[facility]  [address] [address]    Call us at [phone] if you have any questions about your wounds, have redness or swelling around your wound, have a fever of 101 or greater or if you have any other problems or concerns. We answer the phone Monday through Friday 8 am to 4 pm, please leave a message as we check the voicemail frequently throughout the day.     Sage Luna      1980    Medications/supplements to aid in healin. 1 packet of [place] Supplement into your favorite beverage TWICE a day  [facility]urchase [facility] outpatient pharmacy located in [facility].  2. Vitamin D3 5,000 iu per day.  3. EpiCeram to prevent dry skin. Purchase from [facility]    Dressing   To Order: [email or web address] or call [phone] to place an order for 1.75 oz jar or tube  Use PLUROHEALS (all caps) at checkout in the discount code section    Plurogel Wound Dressing Change:Left Leg  Cleanse wound soap and water  Apply Plurogel to wound base or on silvercell dressing  Cover wound with Edemawear  Change dressing daily      EMERALD Tovar M.D.. 2020  Appointments for you to make:  Wound Healing Clinic  Follow up with Provider 4 weeks [phone]

## 2020-01-06 NOTE — PROGRESS NOTES
[id number]

## 2020-01-08 DIAGNOSIS — E10.65 TYPE 1 DIABETES MELLITUS WITH HYPERGLYCEMIA (H): ICD-10-CM

## 2020-01-08 LAB — HBA1C MFR BLD: 9.1 % (ref 0–5.6)

## 2020-01-08 PROCEDURE — 83036 HEMOGLOBIN GLYCOSYLATED A1C: CPT | Performed by: INTERNAL MEDICINE

## 2020-01-08 PROCEDURE — 36415 COLL VENOUS BLD VENIPUNCTURE: CPT | Performed by: INTERNAL MEDICINE

## 2020-01-10 ENCOUNTER — OFFICE VISIT (OUTPATIENT)
Dept: PEDIATRICS | Facility: CLINIC | Age: 40
End: 2020-01-10
Payer: COMMERCIAL

## 2020-01-10 VITALS
BODY MASS INDEX: 24.14 KG/M2 | HEIGHT: 70 IN | OXYGEN SATURATION: 100 % | TEMPERATURE: 98.2 F | WEIGHT: 168.6 LBS | DIASTOLIC BLOOD PRESSURE: 72 MMHG | HEART RATE: 104 BPM | SYSTOLIC BLOOD PRESSURE: 102 MMHG

## 2020-01-10 DIAGNOSIS — E10.65 TYPE 1 DIABETES MELLITUS WITH HYPERGLYCEMIA (H): Primary | ICD-10-CM

## 2020-01-10 PROCEDURE — 99214 OFFICE O/P EST MOD 30 MIN: CPT | Performed by: INTERNAL MEDICINE

## 2020-01-10 RX ORDER — FLASH GLUCOSE SENSOR
2 KIT MISCELLANEOUS
Qty: 2 EACH | Refills: 2 | Status: SHIPPED | OUTPATIENT
Start: 2020-01-10

## 2020-01-10 RX ORDER — FLASH GLUCOSE SCANNING READER
1 EACH MISCELLANEOUS
Qty: 1 DEVICE | Refills: 2 | Status: SHIPPED | OUTPATIENT
Start: 2020-01-10 | End: 2020-07-11

## 2020-01-10 ASSESSMENT — MIFFLIN-ST. JEOR: SCORE: 1681.01

## 2020-01-10 NOTE — PATIENT INSTRUCTIONS
"With respect to your insulin, let's increase to a baseline of 9 units with each meal.  May add 2 units extra for every 20 over 120.     So:  If you are 120-140:  11  140-160:  13  160-180: 15  180-200: 17      No change in your lantus.      Follow up with diabetes educator (CDE). May benefit from a \"freestyle elizabeth\" glucose monitor.   "

## 2020-01-10 NOTE — PROGRESS NOTES
Subjective     Sage Luna is a 39 year old male who presents to clinic today for the following health issues:    HPI     Diabetes Follow-up    How often are you checking your blood sugar? Two times daily  Blood sugar testing frequency justification:  Uncontrolled diabetes  What time of day are you checking your blood sugars (select all that apply)?  Before and after meals  Have you had any blood sugars above 200?  No  Have you had any blood sugars below 70?  No    What symptoms do you notice when your blood sugar is low?  None    What concerns do you have today about your diabetes? None     Do you have any of these symptoms? (Select all that apply)  Throbbing pain the toes on both feet      Have you had a diabetic eye exam in the last 12 months? Yes- Date of last eye exam: 2019    BP Readings from Last 2 Encounters:   01/10/20 102/72   20 119/83     Hemoglobin A1C (%)   Date Value   2020 9.1 (H)   10/25/2019 10.1 (H)     LDL Cholesterol Calculated (mg/dL)   Date Value   2019 69   2007 86       Diabetes Management Resources      How many servings of fruits and vegetables do you eat daily?  2-3    On average, how many sweetened beverages do you drink each day (Examples: soda, juice, sweet tea, etc.  Do NOT count diet or artificially sweetened beverages)?   2    How many days per week do you miss taking your medication? 0    Has been eating smaller more frequent meals.  Cutting back on carbs.  40 units lantus at night.  Then sliding scale novolog with a per carb dose; usually 8-14 units with each meal.     In AM runnin.    Before lunch: 120-150    Insulin:  Now on pens only.      Patient Active Problem List   Diagnosis     Type 1 diabetes mellitus (H)     Vitamin D deficiency     Closed fracture of shaft of femur (H)     Effusion of lower leg joint     Major depressive disorder, recurrent episode (H)     Osteoporosis     Ulcer of left lower extremity with fat layer exposed  (H)     Past Surgical History:   Procedure Laterality Date     ARTHROSCOPY KNEE Right 3/25/2015    Procedure: ARTHROSCOPY KNEE;  Surgeon: Te Mcfarland MD;  Location: [facility] OR     ARTHROSCOPY KNEE WITH LATERAL MENISCECTOMY  2014    Procedure: ARTHROSCOPY KNEE WITH LATERAL MENISCECTOMY;  Surgeon: Te Mcfarland MD;  Location: [facility] OR     OPEN REDUCTION INTERNAL FIXATION FEMUR DISTAL Right 2014    Procedure: OPEN REDUCTION INTERNAL FIXATION FEMUR DISTAL;  Surgeon: Te Mcfarland MD;  Location: [facility] OR     REMOVE HARDWARE KNEE Right 3/25/2015    Procedure: REMOVE HARDWARE KNEE;  Surgeon: Te Mcfarland MD;  Location: [facility] OR       Social History     Tobacco Use     Smoking status: Never Smoker     Smokeless tobacco: Never Used   Substance Use Topics     Alcohol use: Yes     Comment: seldom     Family History   Problem Relation Age of Onset     Hypertension Father      Cancer Maternal Grandmother          , colon cancer     Cancer Paternal Grandfather         leukemia, still alive         Current Outpatient Medications   Medication Sig Dispense Refill     aspirin (ASA) 81 MG tablet Take 1 tablet (81 mg) by mouth daily 90 tablet 3     atorvastatin (LIPITOR) 20 MG tablet Take 1 tablet (20 mg) by mouth daily 90 tablet 3     Continuous Blood Gluc  (FREESTYLE KATHERINE 14 DAY READER) GRACIA 1 each every 14 days for 14 doses 1 Device 2     Continuous Blood Gluc Sensor (FREESTYLE KATHERINE 14 DAY SENSOR) MISC 2 each every 14 days 2 each 2     insulin aspart (NOVOLOG FLEXPEN) 100 UNIT/ML pen Inject 8 Units Subcutaneous 3 times daily (with meals) In addition to sliding scale 8 mL 11     insulin aspart (NOVOLOG FLEXPEN) 100 UNIT/ML pen Sliding scale in addition to baseline 8 units with meals - usually uses 8-14 units total with each meal. 10 mL 11     insulin glargine (LANTUS PEN) 100 UNIT/ML pen Inject 40 Units Subcutaneous At Bedtime 15 mL 11     blood glucose ([employer] TEST STRIPS) test strip Inject 100  each into the skin       Dermatological Products, Misc. (EPICERAM) EMUL Externally apply 1 Applicatorful topically 2 times daily 225 g 11     Dietary Management Product (VASCULERA) TABS Take 1 tablet by mouth daily 90 tablet 3     insulin pen needle (31G X 6 MM) 31G X 6 MM miscellaneous Use 4 pen needles daily or as directed. 400 each 11     order for DME [facility] Phone [phone] Fax [phone]    Primary Dressing Xeroform sheet   Qty 5  Secondary Dressing Prontosan wound irrigation Qty 1 bottle  Secondary Dressing 4x4 gauze loaf Qty 1  Secondary Dressing 4' roll gauze Qty 30  Secondary Dressing 2' medipore tape Qty  1  Length of Need: 1 month  Frequency of dressing change: daily 30 days 0     No Known Allergies  BP Readings from Last 3 Encounters:   01/10/20 102/72   01/06/20 119/83   10/25/19 112/82    Wt Readings from Last 3 Encounters:   01/10/20 76.5 kg (168 lb 9.6 oz)   10/25/19 79.1 kg (174 lb 6.4 oz)   10/02/19 80.5 kg (177 lb 8 oz)                      Reviewed and updated as needed this visit by Provider  Meds         Review of Systems   ROS COMP: CONSTITUTIONAL: NEGATIVE for fever, chills, change in weight  INTEGUMENTARY/SKIN: NEGATIVE for worrisome rashes, moles or lesions  EYES: NEGATIVE for vision changes or irritation  ENT/MOUTH: NEGATIVE for ear, mouth and throat problems  RESP: NEGATIVE for significant cough or SOB  BREAST: NEGATIVE for masses, tenderness or discharge  CV: NEGATIVE for chest pain, palpitations or peripheral edema  GI: NEGATIVE for nausea, abdominal pain, heartburn, or change in bowel habits  : NEGATIVE for frequency, dysuria, or hematuria  MUSCULOSKELETAL: NEGATIVE for significant arthralgias or myalgia  NEURO: NEGATIVE for weakness, dizziness or paresthesias  ENDOCRINE: NEGATIVE for temperature intolerance, skin/hair changes  HEME: NEGATIVE for bleeding problems  PSYCHIATRIC: NEGATIVE for changes in mood or affect      Objective    /72 (BP Location: Right  "arm, Patient Position: Sitting, Cuff Size: Adult Regular)   Pulse 104   Temp 98.2  F (36.8  C) (Oral)   Ht 1.778 m (5' 10\")   Wt 76.5 kg (168 lb 9.6 oz)   SpO2 100%   BMI 24.19 kg/m    Body mass index is 24.19 kg/m .  Physical Exam   GENERAL: healthy, alert and no distress  EYES: Eyes grossly normal to inspection, PERRL and conjunctivae and sclerae normal  HENT: ear canals and TM's normal, nose and mouth without ulcers or lesions  NECK: no adenopathy, no asymmetry, masses, or scars and thyroid normal to palpation  RESP: lungs clear to auscultation - no rales, rhonchi or wheezes  CV: regular rate and rhythm, normal S1 S2, no S3 or S4, no murmur, click or rub, no peripheral edema and peripheral pulses strong  ABDOMEN: soft, nontender, no hepatosplenomegaly, no masses and bowel sounds normal  MS: no gross musculoskeletal defects noted, no edema  SKIN: no suspicious lesions or rashes  NEURO: Normal strength and tone, mentation intact and speech normal  PSYCH: mentation appears normal, affect normal/bright    Diagnostic Test Results:  Labs reviewed in Epic        Assessment & Plan     1. Type 1 diabetes mellitus with hyperglycemia (H)    Improved diabetes blood test (A1c) but not at goal.  Begin Freestyle Rajani and follow up with diabetes educator (CDE); in meantime, increase each mealtime dose by 1 unit.  Follow up with me in 3 months. May need better carb counting to account for his intake, not just to account for his premeal sugars.   Patient Instructions   With respect to your insulin, let's increase to a baseline of 9 units with each meal.  May add 2 units extra for every 20 over 120.     So:  If you are 120-140:  11  140-160:  13  160-180: 15  180-200: 17      No change in your lantus.      Follow up with diabetes educator (CDE). May benefit from a \"freestyle rajani\" glucose monitor.      - AMBULATORY ADULT DIABETES EDUCATOR REFERRAL  - Continuous Blood Gluc  (FREESTYLE RAJANI 14 DAY READER) GRACIA; 1 " each every 14 days for 14 doses  Dispense: 1 Device; Refill: 2  - Continuous Blood Gluc Sensor (FREESTYLE KATHERINE 14 DAY SENSOR) MISC; 2 each every 14 days  Dispense: 2 each; Refill: 2           No follow-ups on file.    Harsh Dudley MD  [facility]

## 2020-01-13 ENCOUNTER — TELEPHONE (OUTPATIENT)
Dept: PEDIATRICS | Facility: CLINIC | Age: 40
End: 2020-01-13

## 2020-01-13 NOTE — TELEPHONE ENCOUNTER
Diabetes Education Scheduling Outreach #1:    Call to patient to schedule. Left message with phone number to call to schedule.    Plan for 2nd outreach attempt within 1 week.    Debbie Hinojosa  [place] [facility]  Diabetes and Nutrition Scheduling

## 2020-01-24 NOTE — TELEPHONE ENCOUNTER
Diabetes Education Scheduling Outreach #2:    Call to patient to schedule. No answer/busy.    [facility] [place] [facility]  Diabetes and Nutrition Scheduling

## 2020-02-03 ENCOUNTER — HOSPITAL ENCOUNTER (OUTPATIENT)
Dept: WOUND CARE | Facility: CLINIC | Age: 40
Discharge: HOME | End: 2020-02-03
Attending: SURGERY | Admitting: SURGERY
Payer: COMMERCIAL

## 2020-02-03 VITALS
RESPIRATION RATE: 16 BRPM | SYSTOLIC BLOOD PRESSURE: 120 MMHG | TEMPERATURE: 97.4 F | HEART RATE: 91 BPM | DIASTOLIC BLOOD PRESSURE: 85 MMHG

## 2020-02-03 DIAGNOSIS — L97.922 ULCER OF LEFT LOWER EXTREMITY WITH FAT LAYER EXPOSED (H): ICD-10-CM

## 2020-02-03 PROCEDURE — 97602 WOUND(S) CARE NON-SELECTIVE: CPT

## 2020-02-03 PROCEDURE — 99212 OFFICE O/P EST SF 10 MIN: CPT | Performed by: SURGERY

## 2020-02-03 NOTE — PROGRESS NOTES
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  Had completely healed left anterior tibial ulceration as a result of dietrich thorn injury in the fall 2018, utilize edema wear and Plurogel, has had a small area that is reopened.  Denies fevers chills or sweats.  Hemoglobin A1c 9.0, he states this is result of food choices and stress.  Does not utilize tobacco.    Patient Active Problem List   Diagnosis     Type 1 diabetes mellitus (H)     Vitamin D deficiency     Closed fracture of shaft of femur (H)     Effusion of lower leg joint     Major depressive disorder, recurrent episode (H)     Osteoporosis     Ulcer of left lower extremity with fat layer exposed (H)     Past Medical History:   Diagnosis Date     Diabetes (H)      Lyme disease      Exam:  /85 (BP Location: Right arm)   Pulse 91   Temp 97.4  F (36.3  C) (Temporal)   Resp 16      39-year-old male, nondistressed, palpable left her Mejia pedis pulse, see photodocumentation and measurement for full delineation of small area of epithelial loss left anterior tibial margin, remainder of wound is otherwise remained epithelialized, he has new hair growth which was not present previously at original consult.      Impression: Left lower extremity superficial ulceration    Plan: We will dress the wounds with Plurogel directly on wound, pull edema wear on, no intervening bandage, change on daily basis.  Patient will return to the clinic in 6 weeks time if not healed.  When completely closed, use EpiCeram on a daily basis lifelong.    Christos Tovar MD on 2/3/2020 at 12:42 PM

## 2020-02-03 NOTE — DISCHARGE INSTRUCTIONS
[facility]  [address] [address]    Call us at [phone] if you have any questions about your wounds, have redness or swelling around your wound, have a fever of 101 or greater or if you have any other problems or concerns. We answer the phone Monday through Friday 8 am to 4 pm, please leave a message as we check the voicemail frequently throughout the day.     Sage Luna      1980    Medications/supplements to aid in healin. 1 packet of Waylon Supplement into your favorite beverage TWICE a day purchase  [facility] outpatient pharmacy located in Located within [facility].  2. Vitamin D3 5,000 iu per day.  3. EpiCeram to prevent dry skin. Purchase from [facility]  Dressing plurogel Wound Dressing Change:Left Leg  Cleanse wound soap and water  Apply Epiceram lotion to skin on leg  Apply Plurogel Cover wound with Edemawear  Change dressing daily.      EMERALD Tovar M.D.. February 3, 2020    Appointments for you to make:  Wound Healing Clinic  Follow up with Provider - 6 weeks if needed  [phone]

## 2020-03-03 ENCOUNTER — TELEPHONE (OUTPATIENT)
Facility: CLINIC | Age: 40
End: 2020-03-03

## 2020-03-03 DIAGNOSIS — L97.922 ULCER OF LEFT LOWER EXTREMITY WITH FAT LAYER EXPOSED (H): ICD-10-CM

## 2020-03-03 RX ORDER — EMOLLIENT COMBINATION NO.32
1 EMULSION, EXTENDED RELEASE TOPICAL 2 TIMES DAILY
Qty: 225 G | Refills: 11 | Status: SHIPPED | OUTPATIENT
Start: 2020-03-03 | End: 2023-11-03

## 2020-03-03 NOTE — TELEPHONE ENCOUNTER
Patient called earlier today and I called him back. I let him know that the doctor would be in later and we would get it reordered and sent to the correct pharmacy. Spoke to Dr Tovar about the correct pharmacy and then reordered and resent the prescription to the correct pharmacy. Notified the patient and also gave  Him their phone# [phone]

## 2020-06-26 ENCOUNTER — TRANSFERRED RECORDS (OUTPATIENT)
Dept: HEALTH INFORMATION MANAGEMENT | Facility: CLINIC | Age: 40
End: 2020-06-26

## 2020-06-26 LAB — RETINOPATHY: POSITIVE

## 2020-10-23 DIAGNOSIS — E10.65 TYPE 1 DIABETES MELLITUS WITH HYPERGLYCEMIA (H): ICD-10-CM

## 2020-10-23 NOTE — TELEPHONE ENCOUNTER
Routing refill request to provider for review/approval because:  Labs not current:  If HgbA1C is 8 or greater, it needs to be on file within the past 3 months.  Zachery Jay RN, BSN

## 2020-11-13 DIAGNOSIS — E10.65 TYPE 1 DIABETES MELLITUS WITH HYPERGLYCEMIA (H): ICD-10-CM

## 2020-11-13 NOTE — TELEPHONE ENCOUNTER
Medication is being filled for 1 time refill only due to:  Patient needs to be seen because needs appt.    Pt is scheduled  Zachery Jay RN, BSN

## 2020-11-29 ENCOUNTER — HEALTH MAINTENANCE LETTER (OUTPATIENT)
Age: 40
End: 2020-11-29

## 2020-12-15 ENCOUNTER — OFFICE VISIT (OUTPATIENT)
Dept: PEDIATRICS | Facility: CLINIC | Age: 40
End: 2020-12-15
Payer: COMMERCIAL

## 2020-12-15 VITALS
SYSTOLIC BLOOD PRESSURE: 114 MMHG | WEIGHT: 164.7 LBS | DIASTOLIC BLOOD PRESSURE: 76 MMHG | RESPIRATION RATE: 12 BRPM | BODY MASS INDEX: 23.58 KG/M2 | OXYGEN SATURATION: 100 % | HEART RATE: 94 BPM | TEMPERATURE: 97.6 F | HEIGHT: 70 IN

## 2020-12-15 DIAGNOSIS — Z23 NEED FOR 23-POLYVALENT PNEUMOCOCCAL POLYSACCHARIDE VACCINE: ICD-10-CM

## 2020-12-15 DIAGNOSIS — Z11.59 NEED FOR HEPATITIS C SCREENING TEST: ICD-10-CM

## 2020-12-15 DIAGNOSIS — E10.65 TYPE 1 DIABETES MELLITUS WITH HYPERGLYCEMIA (H): Primary | ICD-10-CM

## 2020-12-15 LAB — HBA1C MFR BLD: 8.3 % (ref 0–5.6)

## 2020-12-15 PROCEDURE — 83036 HEMOGLOBIN GLYCOSYLATED A1C: CPT | Performed by: INTERNAL MEDICINE

## 2020-12-15 PROCEDURE — 99207 PR FOOT EXAM NO CHARGE: CPT | Performed by: INTERNAL MEDICINE

## 2020-12-15 PROCEDURE — 86803 HEPATITIS C AB TEST: CPT | Performed by: INTERNAL MEDICINE

## 2020-12-15 PROCEDURE — 36415 COLL VENOUS BLD VENIPUNCTURE: CPT | Performed by: INTERNAL MEDICINE

## 2020-12-15 PROCEDURE — 90732 PPSV23 VACC 2 YRS+ SUBQ/IM: CPT | Mod: 25 | Performed by: INTERNAL MEDICINE

## 2020-12-15 PROCEDURE — 80061 LIPID PANEL: CPT | Performed by: INTERNAL MEDICINE

## 2020-12-15 PROCEDURE — 99214 OFFICE O/P EST MOD 30 MIN: CPT | Mod: 25 | Performed by: INTERNAL MEDICINE

## 2020-12-15 PROCEDURE — 82043 UR ALBUMIN QUANTITATIVE: CPT | Performed by: INTERNAL MEDICINE

## 2020-12-15 PROCEDURE — 90471 IMMUNIZATION ADMIN: CPT | Performed by: INTERNAL MEDICINE

## 2020-12-15 PROCEDURE — 80053 COMPREHEN METABOLIC PANEL: CPT | Performed by: INTERNAL MEDICINE

## 2020-12-15 RX ORDER — INSULIN ASPART 100 [IU]/ML
INJECTION, SOLUTION INTRAVENOUS; SUBCUTANEOUS
Qty: 10 ML | Refills: 11 | Status: SHIPPED | OUTPATIENT
Start: 2020-12-15 | End: 2021-04-13

## 2020-12-15 RX ORDER — ATORVASTATIN CALCIUM 20 MG/1
20 TABLET, FILM COATED ORAL DAILY
Qty: 90 TABLET | Refills: 3 | Status: SHIPPED | OUTPATIENT
Start: 2020-12-15 | End: 2022-03-11

## 2020-12-15 ASSESSMENT — ANXIETY QUESTIONNAIRES
5. BEING SO RESTLESS THAT IT IS HARD TO SIT STILL: SEVERAL DAYS
1. FEELING NERVOUS, ANXIOUS, OR ON EDGE: SEVERAL DAYS
7. FEELING AFRAID AS IF SOMETHING AWFUL MIGHT HAPPEN: SEVERAL DAYS
GAD7 TOTAL SCORE: 7
2. NOT BEING ABLE TO STOP OR CONTROL WORRYING: SEVERAL DAYS
IF YOU CHECKED OFF ANY PROBLEMS ON THIS QUESTIONNAIRE, HOW DIFFICULT HAVE THESE PROBLEMS MADE IT FOR YOU TO DO YOUR WORK, TAKE CARE OF THINGS AT HOME, OR GET ALONG WITH OTHER PEOPLE: SOMEWHAT DIFFICULT
6. BECOMING EASILY ANNOYED OR IRRITABLE: SEVERAL DAYS
3. WORRYING TOO MUCH ABOUT DIFFERENT THINGS: SEVERAL DAYS

## 2020-12-15 ASSESSMENT — PATIENT HEALTH QUESTIONNAIRE - PHQ9
SUM OF ALL RESPONSES TO PHQ QUESTIONS 1-9: 9
5. POOR APPETITE OR OVEREATING: SEVERAL DAYS

## 2020-12-15 ASSESSMENT — MIFFLIN-ST. JEOR: SCORE: 1658.32

## 2020-12-15 NOTE — PROGRESS NOTES
Subjective     Sage Luna is a 40 year old male who presents to clinic today for the following health issues:    History of Present Illness       Diabetes:   He presents for follow up of diabetes.  He is checking home blood glucose three times daily. He checks blood glucose before meals.  Blood glucose is sometimes over 200 and never under 70. He is aware of hypoglycemia symptoms including shakiness, dizziness and blurred vision. He is concerned about frequent infections.  He is having numbness in feet and weight loss.         He eats 2-3 servings of fruits and vegetables daily.He consumes 2 sweetened beverage(s) daily.He exercises with enough effort to increase his heart rate 20 to 29 minutes per day.  He exercises with enough effort to increase his heart rate 4 days per week.   He is taking medications regularly.           Depression and Anxiety Follow-Up    How are you doing with your depression since your last visit? Up and down    How are you doing with your anxiety since your last visit?  Up and down     Are you having other symptoms that might be associated with depression or anxiety? No    Have you had a significant life event? OTHER: furlouged from work     Do you have any concerns with your use of alcohol or other drugs? No     Sugars low 100s on waking;     Takes 9 units + correction with meals; usually post breakfast 150 or so.      After dinners running 190s to 200.  (Biggest meal)    Still on 40 of lantus    Social History     Tobacco Use     Smoking status: Never Smoker     Smokeless tobacco: Never Used   Substance Use Topics     Alcohol use: Yes     Comment: seldom     Drug use: No     PHQ 10/24/2019 12/15/2020   PHQ-9 Total Score 0 9   Q9: Thoughts of better off dead/self-harm past 2 weeks Not at all Not at all     ADELINA-7 SCORE 12/15/2020   Total Score 7     Last PHQ-9 12/15/2020   1.  Little interest or pleasure in doing things 1   2.  Feeling down, depressed, or hopeless 1   3.  Trouble  falling or staying asleep, or sleeping too much 2   4.  Feeling tired or having little energy 2   5.  Poor appetite or overeating 1   6.  Feeling bad about yourself 1   7.  Trouble concentrating 1   8.  Moving slowly or restless 0   Q9: Thoughts of better off dead/self-harm past 2 weeks 0   PHQ-9 Total Score 9   Difficulty at work, home, or with people Somewhat difficult     ADELINA-7  12/15/2020   1. Feeling nervous, anxious, or on edge 1   2. Not being able to stop or control worrying 1   3. Worrying too much about different things 1   4. Trouble relaxing 1   5. Being so restless that it is hard to sit still 1   6. Becoming easily annoyed or irritable 1   7. Feeling afraid, as if something awful might happen 1   ADELINA-7 Total Score 7   If you checked any problems, how difficult have they made it for you to do your work, take care of things at home, or get along with other people? Somewhat difficult        Suicide Assessment Five-step Evaluation and Treatment (SAFE-T)          Review of Systems   CONSTITUTIONAL: NEGATIVE for fever, chills, change in weight  INTEGUMENTARY/SKIN: NEGATIVE for worrisome rashes, moles or lesions  EYES: NEGATIVE for vision changes or irritation  ENT/MOUTH: NEGATIVE for ear, mouth and throat problems  RESP: NEGATIVE for significant cough or SOB  BREAST: NEGATIVE for masses, tenderness or discharge  CV: NEGATIVE for chest pain, palpitations or peripheral edema  GI: NEGATIVE for nausea, abdominal pain, heartburn, or change in bowel habits  : NEGATIVE for frequency, dysuria, or hematuria  MUSCULOSKELETAL: NEGATIVE for significant arthralgias or myalgia  NEURO: NEGATIVE for weakness, dizziness or paresthesias  ENDOCRINE: NEGATIVE for temperature intolerance, skin/hair changes  HEME: NEGATIVE for bleeding problems  PSYCHIATRIC: NEGATIVE for changes in mood or affect      Objective    /76 (BP Location: Right arm, Patient Position: Sitting, Cuff Size: Adult Regular)   Pulse 94   Temp 97.6  F  "(36.4  C) (Temporal)   Resp 12   Ht 1.778 m (5' 10\")   Wt 74.7 kg (164 lb 11.2 oz)   SpO2 100%   BMI 23.63 kg/m    Body mass index is 23.63 kg/m .  Physical Exam   GENERAL: healthy, alert and no distress  EYES: Eyes grossly normal to inspection, PERRL and conjunctivae and sclerae normal  HENT: ear canals and TM's normal, nose and mouth without ulcers or lesions  NECK: no adenopathy, no asymmetry, masses, or scars and thyroid normal to palpation  RESP: lungs clear to auscultation - no rales, rhonchi or wheezes  CV: regular rate and rhythm, normal S1 S2, no S3 or S4, no murmur, click or rub, no peripheral edema and peripheral pulses strong  ABDOMEN: soft, nontender, no hepatosplenomegaly, no masses and bowel sounds normal  MS: no gross musculoskeletal defects noted, no edema  SKIN: no suspicious lesions or rashes  NEURO: Normal strength and tone, mentation intact and speech normal  PSYCH: mentation appears normal, affect normal/bright  Diabetic foot exam: normal DP and PT pulses, no trophic changes or ulcerative lesions and normal sensory exam             Assessment & Plan     Type 1 diabetes mellitus with hyperglycemia (H)  Increase dinnertime novolog to 11 baseline, plus correction.  Keep lantus same at 40 ; labs today.  May have some room to increase lantus too, if still high.   Other Parameters well controlled.  Continue secondary risk factor modification for BP, cholesterol, anticoagulation, and smoking cessation.   Needs ACE inhibitor discussion next visit.   - PPSV23, IM/SUBQ (2+ YRS) - [id number]  - Hemoglobin A1c  - Comprehensive metabolic panel (BMP + Alb, Alk Phos, ALT, AST, Total. Bili, TP)  - Lipid panel reflex to direct LDL Fasting  - Albumin Random Urine Quantitative with Creat Ratio  - FOOT EXAM  - insulin aspart (NOVOLOG FLEXPEN) 100 UNIT/ML pen; Sliding scale in addition to baseline 9 units with meals - usually uses 8-14 units total with each meal.  - insulin glargine (LANTUS SOLOSTAR) 100 " "UNIT/ML pen; Inject 40 Units Subcutaneous At Bedtime  - atorvastatin (LIPITOR) 20 MG tablet; Take 1 tablet (20 mg) by mouth daily    Need for 23-polyvalent pneumococcal polysaccharide vaccine    - PPSV23, IM/SUBQ (2+ YRS) - [id number]    Need for hepatitis C screening test    - Hepatitis C Screen Reflex to HCV RNA Quant and Genotype        Patient Instructions   Lab work downstairs today.  Directions:  As you walk through the first floor, you'll see (on the right) first the pharmacy, then some bathrooms, then the \"Lab and Imaging\" area. Give them your name at the window there and wait for them to call you.     Pneumovax today in room.    Let's increase your baseline dinner naovolog to 11 units, plus a correction factor if needed.    So:  If you are 120-140:  13  140-160:  15  160-180: 17  180-200: 19    Follow up in 3 months if your diabetes blood test (A1c) is not below 7.8 or so.  If it is below this, we can go a whole 6 months.    Harsh Dudley MD  Internal Medicine and Pediatrics         Return in about 3 months (around 3/15/2021) for diabetes followup, with me, in person.    Harsh Dudley MD  [facility]    "

## 2020-12-15 NOTE — PATIENT INSTRUCTIONS
"Lab work downstairs today.  Directions:  As you walk through the first floor, you'll see (on the right) first the pharmacy, then some bathrooms, then the \"Lab and Imaging\" area. Give them your name at the window there and wait for them to call you.     Pneumovax today in room.    Let's increase your baseline dinner naovolog to 11 units, plus a correction factor if needed.    So:  If you are 120-140:  13  140-160:  15  160-180: 17  180-200: 19    Follow up in 3 months if your diabetes blood test (A1c) is not below 7.8 or so.  If it is below this, we can go a whole 6 months.    Harsh Dudley MD  Internal Medicine and Pediatrics     "

## 2020-12-16 LAB
ALBUMIN SERPL-MCNC: 3.9 G/DL (ref 3.4–5)
ALP SERPL-CCNC: 106 U/L (ref 40–150)
ALT SERPL W P-5'-P-CCNC: 41 U/L (ref 0–70)
ANION GAP SERPL CALCULATED.3IONS-SCNC: 3 MMOL/L (ref 3–14)
AST SERPL W P-5'-P-CCNC: 21 U/L (ref 0–45)
BILIRUB SERPL-MCNC: 0.2 MG/DL (ref 0.2–1.3)
BUN SERPL-MCNC: 16 MG/DL (ref 7–30)
CALCIUM SERPL-MCNC: 8.9 MG/DL (ref 8.5–10.1)
CHLORIDE SERPL-SCNC: 106 MMOL/L (ref 94–109)
CHOLEST SERPL-MCNC: 119 MG/DL
CO2 SERPL-SCNC: 29 MMOL/L (ref 20–32)
CREAT SERPL-MCNC: 0.84 MG/DL (ref 0.66–1.25)
CREAT UR-MCNC: 297 MG/DL
GFR SERPL CREATININE-BSD FRML MDRD: >90 ML/MIN/{1.73_M2}
GLUCOSE SERPL-MCNC: 141 MG/DL (ref 70–99)
HDLC SERPL-MCNC: 31 MG/DL
LDLC SERPL CALC-MCNC: 77 MG/DL
MICROALBUMIN UR-MCNC: 25 MG/L
MICROALBUMIN/CREAT UR: 8.55 MG/G CR (ref 0–17)
NONHDLC SERPL-MCNC: 88 MG/DL
POTASSIUM SERPL-SCNC: 4.4 MMOL/L (ref 3.4–5.3)
PROT SERPL-MCNC: 7.6 G/DL (ref 6.8–8.8)
SODIUM SERPL-SCNC: 138 MMOL/L (ref 133–144)
TRIGL SERPL-MCNC: 54 MG/DL

## 2020-12-16 ASSESSMENT — ANXIETY QUESTIONNAIRES: GAD7 TOTAL SCORE: 7

## 2020-12-21 LAB — HCV AB SERPL QL IA: NONREACTIVE

## 2021-04-13 ENCOUNTER — OFFICE VISIT (OUTPATIENT)
Dept: PEDIATRICS | Facility: CLINIC | Age: 41
End: 2021-04-13
Payer: COMMERCIAL

## 2021-04-13 VITALS
OXYGEN SATURATION: 98 % | HEIGHT: 70 IN | WEIGHT: 162.2 LBS | TEMPERATURE: 98 F | BODY MASS INDEX: 23.22 KG/M2 | SYSTOLIC BLOOD PRESSURE: 128 MMHG | DIASTOLIC BLOOD PRESSURE: 82 MMHG | HEART RATE: 103 BPM

## 2021-04-13 DIAGNOSIS — E10.65 TYPE 1 DIABETES MELLITUS WITH HYPERGLYCEMIA (H): ICD-10-CM

## 2021-04-13 LAB — HBA1C MFR BLD: 10 % (ref 0–5.6)

## 2021-04-13 PROCEDURE — 83036 HEMOGLOBIN GLYCOSYLATED A1C: CPT | Performed by: INTERNAL MEDICINE

## 2021-04-13 PROCEDURE — 36415 COLL VENOUS BLD VENIPUNCTURE: CPT | Performed by: INTERNAL MEDICINE

## 2021-04-13 PROCEDURE — 99214 OFFICE O/P EST MOD 30 MIN: CPT | Performed by: INTERNAL MEDICINE

## 2021-04-13 RX ORDER — INSULIN ASPART 100 [IU]/ML
INJECTION, SOLUTION INTRAVENOUS; SUBCUTANEOUS
Qty: 10 ML | Refills: 11 | COMMUNITY
Start: 2021-04-13 | End: 2022-01-21

## 2021-04-13 ASSESSMENT — PATIENT HEALTH QUESTIONNAIRE - PHQ9: SUM OF ALL RESPONSES TO PHQ QUESTIONS 1-9: 5

## 2021-04-13 ASSESSMENT — MIFFLIN-ST. JEOR: SCORE: 1646.98

## 2021-04-13 NOTE — PATIENT INSTRUCTIONS
"Lab work downstairs today.  Directions:  As you walk through the first floor, you'll see (on the right) first the pharmacy, then some bathrooms, then the \"Lab and Imaging\" area. Give them your name at the window there and wait for them to call you.     If your diabetes blood test (A1c) is below 8, we can keep doses where they are at.    Harsh Dudley MD  Internal Medicine and Pediatrics     "

## 2021-04-13 NOTE — LETTER
My Depression Action Plan  Name: Sage Luna   Date of Birth 1980  Date: 4/13/2021    My doctor: Harsh Dudley   My clinic: [facility]  [address] [place] [address]  [address]  [facility] [address] [phone]          GREEN    ZONE   Good Control    What it looks like:     Things are going generally well. You have normal ups and downs. You may even feel depressed from time to time, but bad moods usually last less than a day.   What you need to do:  1. Continue to care for yourself (see self care plan)  2. Check your depression survival kit and update it as needed  3. Follow your physician s recommendations including any medication.  4. Do not stop taking medication unless you consult with your physician first.           YELLOW         ZONE Getting Worse    What it looks like:     Depression is starting to interfere with your life.     It may be hard to get out of bed; you may be starting to isolate yourself from others.    Symptoms of depression are starting to last most all day and this has happened for several days.     You may have suicidal thoughts but they are not constant.   What you need to do:     1. Call your care team. Your response to treatment will improve if you keep your care team informed of your progress. Yellow periods are signs an adjustment may need to be made.     2. Continue your self-care.  Just get dressed and ready for the day.  Don't give yourself time to talk yourself out of it.    3. Talk to someone in your support network.    4. Open up your Depression Self-Care Plan/Wellness Kit.           RED    ZONE Medical Alert - Get Help    What it looks like:     Depression is seriously interfering with your life.     You may experience these or other symptoms: You can t get out of bed most days, can t work or engage in other necessary activities, you have trouble taking care of basic hygiene, or basic responsibilities, thoughts of suicide or death  that will not go away, self-injurious behavior.     What you need to do:  1. Call your care team and request a same-day appointment. If they are not available (weekends or after hours) call your local crisis line, emergency room or 911.          Depression Self-Care Plan / Wellness Kit    Many people find that medication and therapy are helpful treatments for managing depression. In addition, making small changes to your everyday life can help to boost your mood and improve your wellbeing. Below are some tips for you to consider. Be sure to talk with your medical provider and/or behavioral health consultant if your symptoms are worsening or not improving.     Sleep   Sleep hygiene  means all of the habits that support good, restful sleep. It includes maintaining a consistent bedtime and wake time, using your bedroom only for sleeping or sex, and keeping the bedroom dark and free of distractions like a computer, smartphone, or television.     Develop a Healthy Routine  Maintain good hygiene. Get out of bed in the morning, make your bed, brush your teeth, take a shower, and get dressed. Don t spend too much time viewing media that makes you feel stressed. Find time to relax each day.    Exercise  Get some form of exercise every day. This will help reduce pain and release endorphins, the  feel good  chemicals in your brain. It can be as simple as just going for a walk or doing some gardening, anything that will get you moving.      Diet  Strive to eat healthy foods, including fruits and vegetables. Drink plenty of water. Avoid excessive sugar, caffeine, alcohol, and other mood-altering substances.     Stay Connected with Others  Stay in touch with friends and family members.    Manage Your Mood  Try deep breathing, massage therapy, biofeedback, or meditation. Take part in fun activities when you can. Try to find something to smile about each day.     Psychotherapy  Be open to working with a therapist if your provider  recommends it.     Medication  Be sure to take your medication as prescribed. Most anti-depressants need to be taken every day. It usually takes several weeks for medications to work. Not all medicines work for all people. It is important to follow-up with your provider to make sure you have a treatment plan that is working for you. Do not stop your medication abruptly without first discussing it with your provider.    Crisis Resources   These hotlines are for both adults and children. They and are open 24 hours a day, 7 days a week unless noted otherwise.      National Suicide Prevention Lifeline   5-263-077-DWAE (3605)      Crisis Text Line    www.crisistextline.org  Text HOME to 961033 from anywhere in the [place], anytime, about any type of crisis. A live, trained crisis counselor will receive the text and respond quickly.      [facility] Lifeline for LGBTQ Youth  A national crisis intervention and suicide lifeline for LGBTQ youth under 25. Provides a safe place to talk without judgement. Call 1-544.494.3773; text START to 554393 or visit www.thetrevorproject.org to talk to a trained counselor.      For [facility] crisis numbers, visit the [facility] website at:  https://[place].gov/dhs/people-we-serve/adults/health-care/mental-health/resources/crisis-contacts.jsp

## 2021-04-13 NOTE — PROGRESS NOTES
"    Assessment & Plan     Type 1 diabetes mellitus with hyperglycemia (H)  Patient Instructions   Lab work downstairs today.  Directions:  As you walk through the first floor, you'll see (on the right) first the pharmacy, then some bathrooms, then the \"Lab and Imaging\" area. Give them your name at the window there and wait for them to call you.     If your diabetes blood test (A1c) is below 8, we can keep doses where they are at.    Harsh Dudley MD  Internal Medicine and Pediatrics        - insulin aspart (NOVOLOG FLEXPEN) 100 UNIT/ML pen; Sliding scale in addition to baseline 11 units with meals - usually uses 11-15 units total with each meal.  - Hemoglobin A1c                 Return in about 6 months (around 10/13/2021) for diabetes followup, with me, in person.    Harsh Dudley MD  [facility] RAFFY Cazares is a 40 year old who presents for the following health issues     HPI     Diabetes Follow-up    How often are you checking your blood sugar? One time daily  What time of day are you checking your blood sugars (select all that apply)?  Before and after meals  Have you had any blood sugars above 200?  No  Have you had any blood sugars below 70?  No    What symptoms do you notice when your blood sugar is low?  None    What concerns do you have today about your diabetes? None     Do you have any of these symptoms? (Select all that apply)  Numbness in feet     Has had a newer job with different hours.  Works now in a gym; as a manager.  Active.     Works 11-5.    Eating late breakfast.  AM sugars 100-120.  After meals usually in evenings 150, to 180 max.    40 lantus.  Then 11 + correction with each meal.  No significant highs.      BP Readings from Last 2 Encounters:   12/15/20 114/76   02/03/20 120/85     Hemoglobin A1C (%)   Date Value   12/15/2020 8.3 (H)   01/08/2020 9.1 (H)     LDL Cholesterol Calculated (mg/dL)   Date Value   12/15/2020 77   09/26/2019 69               Depression " "Followup    How are you doing with your depression since your last visit? No change    Are you having other symptoms that might be associated with depression? No    Have you had a significant life event?  No     Are you feeling anxious or having panic attacks?   No    Do you have any concerns with your use of alcohol or other drugs? No    Social History     Tobacco Use     Smoking status: Never Smoker     Smokeless tobacco: Never Used   Substance Use Topics     Alcohol use: Yes     Comment: seldom     Drug use: No     PHQ 10/24/2019 12/15/2020   PHQ-9 Total Score 0 9   Q9: Thoughts of better off dead/self-harm past 2 weeks Not at all Not at all     ADELINA-7 SCORE 12/15/2020   Total Score 7         Suicide Assessment Five-step Evaluation and Treatment (SAFE-T)          Review of Systems   CONSTITUTIONAL: NEGATIVE for fever, chills, change in weight  INTEGUMENTARY/SKIN: NEGATIVE for worrisome rashes, moles or lesions  EYES: NEGATIVE for vision changes or irritation  ENT/MOUTH: NEGATIVE for ear, mouth and throat problems  RESP: NEGATIVE for significant cough or SOB  CV: NEGATIVE for chest pain, palpitations or peripheral edema  GI: NEGATIVE for nausea, abdominal pain, heartburn, or change in bowel habits  : NEGATIVE for frequency, dysuria, or hematuria  MUSCULOSKELETAL: NEGATIVE for significant arthralgias or myalgia  NEURO: NEGATIVE for weakness, dizziness or paresthesias  ENDOCRINE: NEGATIVE for temperature intolerance, skin/hair changes  HEME: NEGATIVE for bleeding problems      Objective    /82 (BP Location: Right arm, Patient Position: Sitting, Cuff Size: Adult Regular)   Pulse 103   Temp 98  F (36.7  C) (Temporal)   Ht 1.778 m (5' 10\")   Wt 73.6 kg (162 lb 3.2 oz)   SpO2 98%   BMI 23.27 kg/m    Body mass index is 23.27 kg/m .  Physical Exam   GENERAL: healthy, alert and no distress  EYES: Eyes grossly normal to inspection, PERRL and conjunctivae and sclerae normal  HENT: ear canals and TM's normal, " nose and mouth without ulcers or lesions  NECK: no adenopathy, no asymmetry, masses, or scars and thyroid normal to palpation  RESP: lungs clear to auscultation - no rales, rhonchi or wheezes  CV: regular rate and rhythm, normal S1 S2, no S3 or S4, no murmur, click or rub, no peripheral edema and peripheral pulses strong  ABDOMEN: soft, nontender, no hepatosplenomegaly, no masses and bowel sounds normal  MS: no gross musculoskeletal defects noted, no edema  SKIN: no suspicious lesions or rashes  NEURO: Normal strength and tone, mentation intact and speech normal  PSYCH: mentation appears normal, affect normal/bright    .

## 2021-06-01 VITALS — HEIGHT: 69 IN | WEIGHT: 166 LBS | BODY MASS INDEX: 24.59 KG/M2

## 2021-06-18 NOTE — PROGRESS NOTES
Office Visit - Follow Up   Sage Luna   37 y.o. male    Date of Visit: 5/9/2018    Chief Complaint   Patient presents with     Diabetes     Diabetic check up, fasting for lab work         Assessment and Plan   1. Type 1 diabetes mellitus without complication  Currently thinks his blood sugars are overall a bit better controlled.  He is on a sliding scale of insulin before meals and daily dose of Lantus as described below.  Blood pressure is 108/70 and thus starting lisinopril does not seem appropriate.  After age 40 we should consider a statin therapy.  - Comprehensive Metabolic Panel  - Lipid Cascade  - Glycosylated Hemoglobin A1c    2. Osteoporosis  He has a history of documented rather severe osteoporosis after a femur fracture in 2014.  He has not been on any significant therapy  for this since then.  Testosterone levels were modestly low.  I had suggested him seeing a endocrinologist Dr. Bran 2014 this was never accomplished.  We talked today about seeing an endocrinologist especially if his thyroid or testosterone levels are low.  We talked about a 5 Day glucose monitoring testing which would give him some better insight into his blood sugars.  All these results come back we will discuss next steps.  He clearly needs more frequent office visits with diabetic professionals-physicians.  Discussed this today  - Thyroid Stimulating Hormone (TSH)  - Testosterone, Total and Free  - DXA Bone Density Scan; Future  - Vitamin D, Total (25-Hydroxy)    3. Mild episode of recurrent major depressive disorder  He has had episodes of depression in the past.  Overall he states that he feels quite well.  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  His wife works full-time.  He has 2 boys ages 10 and 12 who attends school.          No Follow-up on file.     History of Present Illness   This 37 y.o. old comes in after long absence.  His mother who is an ICU nurse and his wife recommended that he finally be seen.  Currently he is on a  "sliding scale of NovoLog insulin 12-15 units before meals is his usual dose.  He also takes 42 units of Lantus daily at at bedtime.  He tells me that his blood sugars generally in the morning or in the low 100s but they are up to about 200 at dinnertime.  He thinks he is doing okay with his blood sugars.  He is on no other prescription medications.  He has a history of a femur fracture after a mild fall in 2014 it was discovered that he had an markedly abnormal bone density study for 37-year-old with T scores in the - 3-4 range.    Review of Systems: A comprehensive review of systems was negative except as noted.     Medications, Allergies and Problem List   Reviewed and updated     Physical Exam   General Appearance:       /74 (Patient Site: Left Arm, Patient Position: Sitting, Cuff Size: Adult Regular)  Pulse 92  Ht 5' 9\" (1.753 m)  Wt 166 lb (75.3 kg)  SpO2 99%  BMI 24.51 kg/m2    Blood pressure is quite nice and low.  Body mass index is ideal.  Weight is stable.  He is not jaundiced.  Lungs are clear.  Her chills no murmurs or gallops rhythm is regular.  Light touch to his feet seem normal.  No edema.  He reports that his skin has no ulcers.  Calluses.     Additional Information   Current Outpatient Prescriptions   Medication Sig Dispense Refill     blood glucose test (GLUCOSE BLOOD) strips Inject 100 each into the skin 4 (four) times a day. Test as directed (OneTouch Ultra Blue In Vitro Strip ) 100 each 11     calcium-magnesium 300-300 mg Tab Take 1 tablet by mouth daily.       calcium-vitamin D 500 mg(1,250mg) -200 unit per tablet Take 1 tablet by mouth 3 (three) times a day with meals.       insulin syringe-needle U-100 0.5 mL 31 gauge x 5/16 Syrg Use 1 each As Directed 4 (four) times a day. 200 each 4     LANTUS U-100 INSULIN 100 unit/mL injection INJECT 42 UNITS UNDER THE SKIN EVERY NIGHT AT BEDTIME 10 mL 0     NOVOLOG U-100 INSULIN ASPART 100 unit/mL injection ADMINISTER 10 UNITS UNDER THE SKIN " THREE TIMES DAILY BEFORE MEALS 10 mL 0     No current facility-administered medications for this visit.      No Known Allergies  Social History   Substance Use Topics     Smoking status: Never Smoker     Smokeless tobacco: Never Used     Alcohol use No       Review and/or order of clinical lab tests:  Review and/or order of radiology tests:  Review and/or order of medicine tests:  Discussion of test results with performing physician:  Decision to obtain old records and/or obtain history from someone other than the patient:  Review and summarization of old records and/or obtaining history from someone other than the patient and.or discussion of case with another health care provider:  Independent visualization of image, tracing or specimen itself:    Time: total time spent with the patient was 25 minutes of which >50% was spent in counseling and coordination of care     Que Wu MD

## 2021-06-23 NOTE — TELEPHONE ENCOUNTER
Refill of Aspart called to Pharmacy.  Note to days to ask them to call patient so he knows it was called in.  Noted on OV information that he keeps running out of insulin and does not give 72 hours notice.  Kelly Stevens, RN, BAN, [facility] RN Triage, 11p-7a

## 2021-06-23 NOTE — TELEPHONE ENCOUNTER
Refill Request  Did you contact pharmacy: Yes.  Date: per patient, 2 days ago  Medication name: LANTUS U-100 INSULIN 100 unit/mL injection  Who prescribed the medication: Que Wu MD   [facility]  Is patient out of medication: Yes  Patient notified refills processed in 72 hours:  yes  Okay to leave a detailed message: yes  [phone]

## 2021-06-23 NOTE — TELEPHONE ENCOUNTER
RN cannot approve Refill Request    RN can NOT refill this medication Protocol failed and NO refill given.     Nia Arvizu, [facility] Triage/Med Refill 1/27/2019    Requested Prescriptions   Pending Prescriptions Disp Refills     NOVOLOG U-100 INSULIN ASPART 100 unit/mL injection [Pharmacy Med Name: NOVOLOG U-100 INSULIN VL10ML(ORANG)] 10 mL 0     Sig: ADMINISTER 10 UNITS UNDER THE SKIN THREE TIMES DAILY BEFORE MEALS    Insulin/GLP-1 Refill Protocol Failed - 1/24/2019 12:12 PM       Failed - Visit with PCP or prescribing provider visit in last 6 months    Last office visit with prescriber/PCP: Visit date not found OR same dept: 5/9/2018 Que Wu MD OR same specialty: 5/9/2018 Que Wu MD Last physical: Visit date not found Last MTM visit: Visit date not found     Next appt within 3 mo: Visit date not found  Next physical within 3 mo: Visit date not found  Prescriber OR PCP: uQe Wu MD  Last diagnosis associated with med order: 1. Diabetes (H)  - NOVOLOG U-100 INSULIN ASPART 100 unit/mL injection [Pharmacy Med Name: NOVOLOG U-100 INSULIN VL10ML(ORANG)]; ADMINISTER 10 UNITS UNDER THE SKIN THREE TIMES DAILY BEFORE MEALS  Dispense: 10 mL; Refill: 0    If protocol passes may refill for 6 months if within 3 months of last provider visit (or a total of 9 months).             Failed - A1C in last 6 months    Hemoglobin A1c   Date Value Ref Range Status   05/09/2018 13.6 (H) 3.5 - 6.0 % Final              Failed - Microalbumin in last year    Microalbumin, Random Urine   Date Value Ref Range Status   02/23/2015 0.93 0.00 - 1.99 mg/dL Final                 Passed - Blood pressure in last year    BP Readings from Last 1 Encounters:   05/09/18 108/74            Passed - Creatinine done in last year    Creatinine   Date Value Ref Range Status   05/09/2018 0.87 0.70 - 1.30 mg/dL Final

## 2021-06-23 NOTE — TELEPHONE ENCOUNTER
RN cannot approve Refill Request  Insulin Aspart    10 units under the skin three times daily before meals    RN can NOT refill this medication PCP messaged that patient is overdue for Labs and Office Visit and Protocol failed and RN gave one month refill. Last office visit: 5/9/2018 Que Wu MD Last Physical: 3/23/2015 Last MTM visit: Visit date not found Last visit same specialty: 5/9/2018 Que Wu MD.  Next visit within 3 mo: Visit date not found  Next physical within 3 mo: Visit date not found  Has OV on 1/29/2019  Last OV 5/9/2018 with A1C  I had to call this to the pharmacy as Epic was not taking it.  Kelly Stevens, [facility] Triage/Med Refill 1/25/2019    There are no medications in this encounter.

## 2021-06-24 NOTE — TELEPHONE ENCOUNTER
RN cannot approve Refill Request    RN can NOT refill this medication Protocol failed and NO refill given.       Nia Arvizu, [facility] Triage/Med Refill 2/21/2019    Requested Prescriptions   Pending Prescriptions Disp Refills     NOVOLOG U-100 INSULIN ASPART 100 unit/mL injection [Pharmacy Med Name: NOVOLOG U-100 INSULIN VL10ML(ORANG)] 10 mL 0     Sig: ADMINISTER 10 UNITS UNDER THE SKIN THREE TIMES DAILY BEFORE MEALS    Insulin/GLP-1 Refill Protocol Failed - 2/19/2019 10:34 AM       Failed - Visit with PCP or prescribing provider visit in last 6 months    Last office visit with prescriber/PCP: Visit date not found OR same dept: 5/9/2018 Que Wu MD OR same specialty: 5/9/2018 Que Wu MD Last physical: Visit date not found Last MTM visit: Visit date not found     Next appt within 3 mo: Visit date not found  Next physical within 3 mo: Visit date not found  Prescriber OR PCP: Que Wu MD  Last diagnosis associated with med order: 1. Diabetes (H)  - NOVOLOG U-100 INSULIN ASPART 100 unit/mL injection [Pharmacy Med Name: NOVOLOG U-100 INSULIN VL10ML(ORANG)]; ADMINISTER 10 UNITS UNDER THE SKIN THREE TIMES DAILY BEFORE MEALS  Dispense: 10 mL; Refill: 0    If protocol passes may refill for 6 months if within 3 months of last provider visit (or a total of 9 months).             Failed - A1C in last 6 months    Hemoglobin A1c   Date Value Ref Range Status   05/09/2018 13.6 (H) 3.5 - 6.0 % Final              Failed - Microalbumin in last year    Microalbumin, Random Urine   Date Value Ref Range Status   02/23/2015 0.93 0.00 - 1.99 mg/dL Final                 Passed - Blood pressure in last year    BP Readings from Last 1 Encounters:   05/09/18 108/74            Passed - Creatinine done in last year    Creatinine   Date Value Ref Range Status   05/09/2018 0.87 0.70 - 1.30 mg/dL Final

## 2021-06-29 ENCOUNTER — TRANSFERRED RECORDS (OUTPATIENT)
Dept: HEALTH INFORMATION MANAGEMENT | Facility: CLINIC | Age: 41
End: 2021-06-29

## 2021-06-29 LAB — RETINOPATHY: POSITIVE

## 2021-07-09 DIAGNOSIS — E10.65 TYPE 1 DIABETES MELLITUS WITH HYPERGLYCEMIA (H): ICD-10-CM

## 2021-07-12 RX ORDER — FLURBIPROFEN SODIUM 0.3 MG/ML
SOLUTION/ DROPS OPHTHALMIC
Qty: 400 EACH | Refills: 0 | Status: SHIPPED | OUTPATIENT
Start: 2021-07-12 | End: 2021-11-09

## 2021-07-12 NOTE — TELEPHONE ENCOUNTER
Prescription approved per [facility] Refill Protocol.    Nia Briceno RN on 7/12/2021 at 11:07 AM

## 2021-09-25 ENCOUNTER — HEALTH MAINTENANCE LETTER (OUTPATIENT)
Age: 41
End: 2021-09-25

## 2021-10-22 ENCOUNTER — HOSPITAL ENCOUNTER (EMERGENCY)
Facility: CLINIC | Age: 41
Discharge: HOME | End: 2021-10-22
Attending: PHYSICIAN ASSISTANT | Admitting: PHYSICIAN ASSISTANT

## 2021-10-22 VITALS
SYSTOLIC BLOOD PRESSURE: 142 MMHG | TEMPERATURE: 97.8 F | DIASTOLIC BLOOD PRESSURE: 93 MMHG | OXYGEN SATURATION: 100 % | RESPIRATION RATE: 17 BRPM | HEART RATE: 90 BPM

## 2021-10-22 DIAGNOSIS — S00.01XA SCALP ABRASION: ICD-10-CM

## 2021-10-22 DIAGNOSIS — M62.830 BACK MUSCLE SPASM: ICD-10-CM

## 2021-10-22 DIAGNOSIS — T14.8XXA MUSCLE STRAIN: ICD-10-CM

## 2021-10-22 DIAGNOSIS — M54.9 BACK PAIN: ICD-10-CM
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
## 2021-10-22 PROCEDURE — 99283 EMERGENCY DEPT VISIT LOW MDM: CPT

## 2021-10-22 PROCEDURE — 250N000013 HC RX MED GY IP 250 OP 250 PS 637: Performed by: PHYSICIAN ASSISTANT

## 2021-10-22 RX ORDER — IBUPROFEN 600 MG/1
600 TABLET, FILM COATED ORAL ONCE
Status: COMPLETED | OUTPATIENT
Start: 2021-10-22 | End: 2021-10-22

## 2021-10-22 RX ORDER — CYCLOBENZAPRINE HCL 10 MG
10 TABLET ORAL 3 TIMES DAILY PRN
Qty: 6 TABLET | Refills: 0 | Status: SHIPPED | OUTPATIENT
Start: 2021-10-22 | End: 2021-10-28

## 2021-10-22 RX ORDER — LIDOCAINE 4 G/G
2 PATCH TOPICAL ONCE
Status: DISCONTINUED | OUTPATIENT
Start: 2021-10-22 | End: 2021-10-22 | Stop reason: HOSPADM

## 2021-10-22 RX ADMIN — IBUPROFEN 600 MG: 600 TABLET ORAL at 16:10

## 2021-10-22 RX ADMIN — LIDOCAINE 2 PATCH: 246 PATCH TOPICAL at 16:11

## 2021-10-22 ASSESSMENT — ENCOUNTER SYMPTOMS
WOUND: 1
NECK PAIN: 0
BACK PAIN: 1
NUMBNESS: 0

## 2021-10-22 NOTE — ED TRIAGE NOTES
Pt works at a [employer] and was moving a [employer] hoop alone. Pt fell on his bottom and hoop fell to the side. No LOC. Pt complains of lower back pain. ABCs intact. A&O X4. Per EMS pt has a laceration on head due to the glass shatter from the hoop.

## 2021-10-22 NOTE — ED NOTES
Bed: ED37  Expected date: 10/22/21  Expected time: 3:21 PM  Means of arrival: Ambulance  Comments:  EMS

## 2021-10-22 NOTE — ED PROVIDER NOTES
History     Chief Complaint:  Back Pain     The history is provided by the patient.      Sage Luna is a 40 year old male with history of type I diabetes mellitus, lyme disease who presents with lower back pain and an abrasion to his scalp. 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 He fell on his lower back, and he is now having pain/stiffness in his lower back. The basketball hoop fell and shattered, which caused a superficial abrasion to his scalp. Sage denies any new numbness or tingling. He is not having any neck pain. He is not anticoagulated. No bowel or bladder incontinence, saddle anesthesia, urinary retention, or any other medical concerns.     Review of Systems   Musculoskeletal: Positive for back pain. Negative for neck pain.   Skin: Positive for wound.   Neurological: Negative for numbness.   All other systems reviewed and are negative.    Allergies:  The patient has no known allergies.     Medications:  Aspirin 81 mg  Lipitor  Insulin    Past Medical History:     Lyme disease  Lyme arthritis  Type I diabetes mellitus   Depression  Osteoporosis  Ulcer of left lower extremity   Hydrarthrosis    Past Surgical History:    Knee arthroscopy x2  ORIF femur  Hardware removal knee    Family History:    Father: hypertension  Sister: hypothyroidism    Social History:  Patient is .  Patient lives with his spouse.  Presents via EMS.    Physical Exam     Patient Vitals for the past 24 hrs:   BP Temp Temp src Pulse Resp SpO2   10/22/21 1700 (!) 142/93 -- -- -- -- --   10/22/21 1645 -- -- -- 90 -- 100 %   10/22/21 1610 -- -- -- -- -- 100 %   10/22/21 1600 -- -- -- -- -- 100 %   10/22/21 1550 -- -- -- -- -- 100 %   10/22/21 1545 (!) 139/93 97.8  F (36.6  C) Oral 89 17 100 %       Physical Exam  Vitals signs and nursing note reviewed.   HENT:      Nose: Nose normal. No congestion or rhinorrhea.      Mouth/Throat:      Mouth: Mucous  membranes are moist.      Pharynx: Oropharynx is clear. No oropharyngeal exudate or posterior oropharyngeal erythema.   Eyes:      General: No scleral icterus.     Extraocular Movements: Extraocular movements intact.      Conjunctiva/sclera: Conjunctivae normal.      Pupils: Pupils are equal, round, and reactive to light.   Cardiovascular:      Rate and Rhythm: Regular rhythm. Normal Rate.     Pulses: Normal pulses.      Heart sounds: Normal heart sounds.   Pulmonary:      Effort: Pulmonary effort is normal.      Breath sounds: Normal breath sounds.   Abdominal:      General: Abdomen is flat. Bowel sounds are normal.      Palpations: Abdomen is soft.      Tenderness: There is no abdominal tenderness.   Musculoskeletal: Normal range of motion bilateral upper and lower extremities.  Observed ambulating independently without difficulty.  There was bilateral reproducible lumbar paraspinal muscle tenderness.  No cervical, thoracic, or lumbar midline bony tenderness.  Normal range of motion of neck.     Right lower leg: No edema.      Left lower leg: No edema.   Skin:     General: Skin is warm and dry. Small superficial abrasion to top of head. No active bleeding.   Neurological:      Mental Status: Alert. Speech normal. Responds appropriately to questions.   Psychiatric:         Mood and Affect: Mood normal.         Behavior: Behavior normal.     Emergency Department Course     Procedures     Wound Care     INDICATION: Superficial scalp abrasion    PERFORMED BY: Lynette Wallis PA-C    LOCATION:  Scalp    FUNCTION:  Distally sensation, circulation and motor are intact.    NOTE: Wound was generously cleaned with wound  and saline solution. Patient tolerated the procedure well with no immediate complications.    Emergency Department Course:  Reviewed:  I reviewed nursing notes, vitals, past medical history, Care Everywhere and [facility]    Assessments:  4926 I obtained history and examined the patient as noted above.    1647 I rechecked the patient and explained findings. He is feeling improved and is wanting to go home.    Interventions:  1610 Ibuprofen 600 mg PO  1611 Lidocaine 4% 2 patches transdermal    Disposition:  The patient was discharged to home.     Impression & Plan     Medical Decision Making:  Sage Luna is a 40 year old male without history of back pain who presents for the evaluation of back pain following a fall as noted in the HPI.  Vitals were reviewed.  The patient's fall was clearly mechanical in nature, not syncope.  There were no prodromal symptoms including chest pain, shortness of breath, confusion, dizziness, seizure activity so I doubt stroke, cardiac arrhythmia, or other serious etiology.  Detailed exam shows mild bilateral lumbar paravertebral tenderness.  No cervical, thoracic, or lumbar midline bony tenderness.  X-rays were deferred given low likelihood of fracture or subluxation.  C-spine cleared clinically.  Normal range of motion of neck.  He had normal range of motion of his bilateral upper and lower extremities and was observed ambulating independently without difficulty.  There was evidence of a superficial abrasion to the top of his head.  No open areas or active bleeding.  Wound was cleaned and dressed appropriately. Given that the patient denied striking her head, loss of consciousness, seizure activity, confusion, weakness, or vision changes, a CT head was deferred at this time.  I observed the patient ambulating in the exam room and he did well.  While in the emergency department, the patient was given a one-time dose of ibuprofen and lidocaine patches were placed.  On recheck, he noted improvement in his presenting symptoms and requested discharge home.  Based on overall well appearance and reassuring physical exam, I felt safe discharging the patient home.  He was advised to follow-up with his primary care provider in 2 to 3 days for reassessment.  Outpatient recommendations were  discussed.  Strict return precautions were discussed.  All questions and concerns were addressed prior to discharge.       Diagnosis:    ICD-10-CM    1. Work related injury  Y99.0    2. Fall, initial encounter  W19.XXXA    3. Scalp abrasion  S00.01XA    4. Back pain  M54.9    5. Muscle strain  T14.8XXA    6. Back muscle spasm  M62.830        Discharge Medications:  Discharge Medication List as of 10/22/2021  5:03 PM      START taking these medications    Details   cyclobenzaprine (FLEXERIL) 10 MG tablet Take 1 tablet (10 mg) by mouth 3 times daily as needed for muscle spasms, Disp-6 tablet, R-0, E-Prescribe             Scribe Disclosure:  I, Milagro Lance, am serving as a scribe at 3:39 PM on 10/22/2021 to document services personally performed by Lynette Wallis PA-C based on my observations and the provider's statements to me.     Lynette Wallis PA-C  10/22/21 2007

## 2021-11-10 DIAGNOSIS — E10.65 TYPE 1 DIABETES MELLITUS WITH HYPERGLYCEMIA (H): ICD-10-CM

## 2021-11-11 RX ORDER — INSULIN GLARGINE 100 [IU]/ML
40 INJECTION, SOLUTION SUBCUTANEOUS AT BEDTIME
Qty: 45 ML | Refills: 0 | Status: SHIPPED | OUTPATIENT
Start: 2021-11-11 | End: 2022-03-11

## 2021-11-20 ENCOUNTER — HEALTH MAINTENANCE LETTER (OUTPATIENT)
Age: 41
End: 2021-11-20

## 2022-01-15 ENCOUNTER — HEALTH MAINTENANCE LETTER (OUTPATIENT)
Age: 42
End: 2022-01-15

## 2022-01-20 DIAGNOSIS — E10.65 TYPE 1 DIABETES MELLITUS WITH HYPERGLYCEMIA (H): ICD-10-CM

## 2022-01-21 RX ORDER — INSULIN ASPART 100 [IU]/ML
INJECTION, SOLUTION INTRAVENOUS; SUBCUTANEOUS
Qty: 9 ML | Refills: 3 | Status: SHIPPED | OUTPATIENT
Start: 2022-01-21 | End: 2022-03-11

## 2022-01-21 NOTE — TELEPHONE ENCOUNTER
Routing refill request to provider for review/approval because:  Medication is reported/historical  Nicky Miller RN, BSN  [facility]

## 2022-03-11 ENCOUNTER — TELEPHONE (OUTPATIENT)
Dept: PEDIATRICS | Facility: CLINIC | Age: 42
End: 2022-03-11

## 2022-03-11 ENCOUNTER — OFFICE VISIT (OUTPATIENT)
Dept: PEDIATRICS | Facility: CLINIC | Age: 42
End: 2022-03-11
Payer: COMMERCIAL

## 2022-03-11 VITALS
BODY MASS INDEX: 23.77 KG/M2 | RESPIRATION RATE: 18 BRPM | HEIGHT: 70 IN | OXYGEN SATURATION: 100 % | HEART RATE: 97 BPM | TEMPERATURE: 97.2 F | DIASTOLIC BLOOD PRESSURE: 77 MMHG | WEIGHT: 166 LBS | SYSTOLIC BLOOD PRESSURE: 115 MMHG

## 2022-03-11 DIAGNOSIS — Z13.220 SCREENING FOR HYPERLIPIDEMIA: ICD-10-CM

## 2022-03-11 DIAGNOSIS — E10.65 TYPE 1 DIABETES MELLITUS WITH HYPERGLYCEMIA (H): Primary | ICD-10-CM

## 2022-03-11 LAB
ALBUMIN SERPL-MCNC: 3.3 G/DL (ref 3.4–5)
ALP SERPL-CCNC: 106 U/L (ref 40–150)
ALT SERPL W P-5'-P-CCNC: 33 U/L (ref 0–70)
ANION GAP SERPL CALCULATED.3IONS-SCNC: 9 MMOL/L (ref 3–14)
AST SERPL W P-5'-P-CCNC: 14 U/L (ref 0–45)
BILIRUB SERPL-MCNC: 0.3 MG/DL (ref 0.2–1.3)
BUN SERPL-MCNC: 12 MG/DL (ref 7–30)
CALCIUM SERPL-MCNC: 8.7 MG/DL (ref 8.5–10.1)
CHLORIDE BLD-SCNC: 111 MMOL/L (ref 94–109)
CHOLEST SERPL-MCNC: 100 MG/DL
CO2 SERPL-SCNC: 23 MMOL/L (ref 20–32)
CREAT SERPL-MCNC: 0.81 MG/DL (ref 0.66–1.25)
CREAT UR-MCNC: 153 MG/DL
FASTING STATUS PATIENT QL REPORTED: ABNORMAL
GFR SERPL CREATININE-BSD FRML MDRD: >90 ML/MIN/1.73M2
GLUCOSE BLD-MCNC: 234 MG/DL (ref 70–99)
HBA1C MFR BLD: 9.1 % (ref 0–5.6)
HDLC SERPL-MCNC: 39 MG/DL
LDLC SERPL CALC-MCNC: 50 MG/DL
MICROALBUMIN UR-MCNC: 12 MG/L
MICROALBUMIN/CREAT UR: 7.84 MG/G CR (ref 0–17)
NONHDLC SERPL-MCNC: 61 MG/DL
POTASSIUM BLD-SCNC: 4.1 MMOL/L (ref 3.4–5.3)
PROT SERPL-MCNC: 7.1 G/DL (ref 6.8–8.8)
SODIUM SERPL-SCNC: 143 MMOL/L (ref 133–144)
TRIGL SERPL-MCNC: 55 MG/DL

## 2022-03-11 PROCEDURE — 80061 LIPID PANEL: CPT | Performed by: INTERNAL MEDICINE

## 2022-03-11 PROCEDURE — 91305 COVID-19,PF,PFIZER (12+ YRS): CPT | Performed by: INTERNAL MEDICINE

## 2022-03-11 PROCEDURE — 0054A COVID-19,PF,PFIZER (12+ YRS): CPT | Performed by: INTERNAL MEDICINE

## 2022-03-11 PROCEDURE — 36415 COLL VENOUS BLD VENIPUNCTURE: CPT | Performed by: INTERNAL MEDICINE

## 2022-03-11 PROCEDURE — 83036 HEMOGLOBIN GLYCOSYLATED A1C: CPT | Performed by: INTERNAL MEDICINE

## 2022-03-11 PROCEDURE — 82043 UR ALBUMIN QUANTITATIVE: CPT | Performed by: INTERNAL MEDICINE

## 2022-03-11 PROCEDURE — 80053 COMPREHEN METABOLIC PANEL: CPT | Performed by: INTERNAL MEDICINE

## 2022-03-11 PROCEDURE — 99214 OFFICE O/P EST MOD 30 MIN: CPT | Performed by: INTERNAL MEDICINE

## 2022-03-11 RX ORDER — INSULIN ASPART 100 [IU]/ML
16 INJECTION, SOLUTION INTRAVENOUS; SUBCUTANEOUS
Qty: 45 ML | Refills: 3 | Status: SHIPPED | OUTPATIENT
Start: 2022-03-11 | End: 2023-08-08

## 2022-03-11 RX ORDER — INSULIN GLARGINE 100 [IU]/ML
40 INJECTION, SOLUTION SUBCUTANEOUS AT BEDTIME
Qty: 45 ML | Refills: 3 | Status: SHIPPED | OUTPATIENT
Start: 2022-03-11 | End: 2023-07-07

## 2022-03-11 RX ORDER — ATORVASTATIN CALCIUM 20 MG/1
20 TABLET, FILM COATED ORAL DAILY
Qty: 90 TABLET | Refills: 3 | Status: SHIPPED | OUTPATIENT
Start: 2022-03-11 | End: 2023-11-03

## 2022-03-11 ASSESSMENT — PATIENT HEALTH QUESTIONNAIRE - PHQ9: SUM OF ALL RESPONSES TO PHQ QUESTIONS 1-9: 0

## 2022-03-11 ASSESSMENT — PAIN SCALES - GENERAL: PAINLEVEL: NO PAIN (0)

## 2022-03-11 NOTE — LETTER
March 14, 2022      Sage Luna  [address] [address] [place] [address]        Dear ,    We are writing to inform you of your test results.    Your diabetes blood test (A1c) has improved from 3 years ago (when it was 13.6), down to 9.1.  This, however, is still higher than we'd like (nearer to 7.0)     I think that seeing an endocrinologist will be necessary, and I've placed a referral to them; they will be contacting you soon.     In the meantime, I would begin checking your sugars about 2 hours after meals.  If your numbers are above 150, then I would increase your premeal insulin by about 2 units with each meal and this might help overall with your A1c.  It sounds like your a.m. sugars are already doing pretty well, so I would stay on about the 40 units of Lantus.  Let us plan on seeing you back after you have had a chance to meet with the endocrinologist, or in 3 months, whichever happens first.  Hope you are well.       Resulted Orders   Lipid panel reflex to direct LDL Fasting   Result Value Ref Range    Cholesterol 100 <200 mg/dL    Triglycerides 55 <150 mg/dL    Direct Measure HDL 39 (L) >=40 mg/dL    LDL Cholesterol Calculated 50 <=100 mg/dL    Non HDL Cholesterol 61 <130 mg/dL    Patient Fasting > 8hrs? Unknown     Narrative    Cholesterol  Desirable:  <200 mg/dL    Triglycerides  Normal:  Less than 150 mg/dL  Borderline High:  150-199 mg/dL  High:  200-499 mg/dL  Very High:  Greater than or equal to 500 mg/dL    Direct Measure HDL  Female:  Greater than or equal to 50 mg/dL   Male:  Greater than or equal to 40 mg/dL    LDL Cholesterol  Desirable:  <100mg/dL  Above Desirable:  100-129 mg/dL   Borderline High:  130-159 mg/dL   High:  160-189 mg/dL   Very High:  >= 190 mg/dL    Non HDL Cholesterol  Desirable:  130 mg/dL  Above Desirable:  130-159 mg/dL  Borderline High:  160-189 mg/dL  High:  190-219 mg/dL  Very High:  Greater than or equal to 220 mg/dL   Albumin Random Urine Quantitative with  Creat Ratio   Result Value Ref Range    Creatinine Urine mg/dL 153 mg/dL    Albumin Urine mg/L 12 mg/L    Albumin Urine mg/g Cr 7.84 0.00 - 17.00 mg/g Cr   Comprehensive metabolic panel (BMP + Alb, Alk Phos, ALT, AST, Total. Bili, TP)   Result Value Ref Range    Sodium 143 133 - 144 mmol/L    Potassium 4.1 3.4 - 5.3 mmol/L    Chloride 111 (H) 94 - 109 mmol/L    Carbon Dioxide (CO2) 23 20 - 32 mmol/L    Anion Gap 9 3 - 14 mmol/L    Urea Nitrogen 12 7 - 30 mg/dL    Creatinine 0.81 0.66 - 1.25 mg/dL    Calcium 8.7 8.5 - 10.1 mg/dL    Glucose 234 (H) 70 - 99 mg/dL    Alkaline Phosphatase 106 40 - 150 U/L    AST 14 0 - 45 U/L    ALT 33 0 - 70 U/L    Protein Total 7.1 6.8 - 8.8 g/dL    Albumin 3.3 (L) 3.4 - 5.0 g/dL    Bilirubin Total 0.3 0.2 - 1.3 mg/dL    GFR Estimate >90 >60 mL/min/1.73m2      Comment:      Effective December 21, 2021 eGFRcr in adults is calculated using the 2021 CKD-EPI creatinine equation which includes age and gender (Spencer et al., NEJ, DOI: 10.1056/RKQMme7123916)   Hemoglobin A1c   Result Value Ref Range    Hemoglobin A1C 9.1 (H) 0.0 - 5.6 %      Comment:      Normal <5.7%   Prediabetes 5.7-6.4%    Diabetes 6.5% or higher     Note: Adopted from ADA consensus guidelines.       If you have any questions or concerns, please call the clinic at the number listed above.       Sincerely,      Harsh Dudley MD

## 2022-03-11 NOTE — PATIENT INSTRUCTIONS
"Lab work today:  We can do labs in the exam room today, or you can get them done downstairs in the lab.      If you are going downstairs:  Directions:  As you walk through the first floor, you'll see (on the right) first the pharmacy, then some bathrooms, then the \"Lab and Imaging\" area. Give them your name at the window there and wait for them to call you.     If your diabetes blood test (A1c) is high, we should probably have you establish with endocrine for possible better monitoring system.     With diabetes, you need an diabetic eye exam every year!  Please have your eye doctor forward your yearly eye exam results to me: Dr. Dudley, fax . (You could instead just start seeing our eye clinic here at [facility] by calling [phone], Dr. Nancy Hernandez.)    COVID booster shot today.     Harsh Dudley MD  Internal Medicine and Pediatrics     "

## 2022-03-11 NOTE — PROGRESS NOTES
Assessment & Plan     Screening for hyperlipidemia  Labs today.  Continue with statin and aspirin.   - Lipid panel reflex to direct LDL Fasting; Future    Type 1 diabetes mellitus with hyperglycemia (H)  Poor control.  If elevated again today, will recommend further management with insulin pump per Endo.   - Albumin Random Urine Quantitative with Creat Ratio; Future  - Comprehensive metabolic panel (BMP + Alb, Alk Phos, ALT, AST, Total. Bili, TP); Future  - atorvastatin (LIPITOR) 20 MG tablet; Take 1 tablet (20 mg) by mouth daily  - Insulin Aspart FlexPen 100 UNIT/ML SOPN; Inject 16 Units Subcutaneous 3 times daily (before meals)  - insulin glargine (LANTUS SOLOSTAR) 100 UNIT/ML pen; Inject 40 Units Subcutaneous At Bedtime             See Patient Instructions    Return in about 3 months (around 6/11/2022) for diabetes followup, with me, in person.    Harsh Dudley MD  [facility] RAFFY Cazares is a 41 year old who presents for the following health issues       AM sugars 100-120.  Taking 38 to 40 units of lantus.     premeal sugars 6-12 units, depending on his meals and exercise.      Checks before meals: running 150-200.  Not checking after meals.     Eye doctor: [facility].     History of Present Illness       Diabetes:   He presents for follow up of diabetes.  He is checking home blood glucose two times daily. He checks blood glucose before meals.  Blood glucose is sometimes over 200 and never under 70. He is aware of hypoglycemia symptoms including weakness and blurred vision. He is concerned about frequent infections.  He is having numbness in feet and weight loss.         He eats 0-1 servings of fruits and vegetables daily.He consumes 2 sweetened beverage(s) daily.He exercises with enough effort to increase his heart rate 10 to 19 minutes per day.  He exercises with enough effort to increase his heart rate 5 days per week.   He is taking medications regularly.    "          Review of Systems   CONSTITUTIONAL: NEGATIVE for fever, chills, change in weight  INTEGUMENTARY/SKIN: NEGATIVE for worrisome rashes, moles or lesions  EYES: NEGATIVE for vision changes or irritation  ENT/MOUTH: NEGATIVE for ear, mouth and throat problems  RESP: NEGATIVE for significant cough or SOB  BREAST: NEGATIVE for masses, tenderness or discharge  CV: NEGATIVE for chest pain, palpitations or peripheral edema  GI: NEGATIVE for nausea, abdominal pain, heartburn, or change in bowel habits  : NEGATIVE for frequency, dysuria, or hematuria  MUSCULOSKELETAL: NEGATIVE for significant arthralgias or myalgia  NEURO: NEGATIVE for weakness, dizziness or paresthesias  ENDOCRINE: NEGATIVE for temperature intolerance, skin/hair changes  HEME: NEGATIVE for bleeding problems  PSYCHIATRIC: NEGATIVE for changes in mood or affect      Objective    /77   Pulse 97   Temp 97.2  F (36.2  C) (Tympanic)   Resp 18   Ht 1.778 m (5' 10\")   Wt 75.3 kg (166 lb)   SpO2 100%   BMI 23.82 kg/m    Body mass index is 23.82 kg/m .  Physical Exam   GENERAL: healthy, alert and no distress  EYES: Eyes grossly normal to inspection, PERRL and conjunctivae and sclerae normal  HENT: ear canals and TM's normal, nose and mouth without ulcers or lesions  NECK: no adenopathy, no asymmetry, masses, or scars and thyroid normal to palpation  RESP: lungs clear to auscultation - no rales, rhonchi or wheezes  CV: regular rate and rhythm, normal S1 S2, no S3 or S4, no murmur, click or rub, no peripheral edema and peripheral pulses strong  ABDOMEN: soft, nontender, no hepatosplenomegaly, no masses and bowel sounds normal  MS: no gross musculoskeletal defects noted, no edema  SKIN: no suspicious lesions or rashes  NEURO: Normal strength and tone, mentation intact and speech normal  PSYCH: mentation appears normal, affect normal/bright                "

## 2022-03-11 NOTE — TELEPHONE ENCOUNTER
Patient was called and he said he is going to come back to the clinic after 5 today when he gets off of work to get his A1C at the lab.

## 2022-03-11 NOTE — TELEPHONE ENCOUNTER
Please call lab. See if diabetes blood test (A1c) can be added on.  If not, patient needs to return to clinic to recheck this; it was somehow missed on my ordering.    Harsh Dudley MD  Internal Medicine and Pediatrics

## 2022-06-13 ENCOUNTER — OFFICE VISIT (OUTPATIENT)
Dept: PEDIATRICS | Facility: CLINIC | Age: 42
End: 2022-06-13
Payer: COMMERCIAL

## 2022-06-13 VITALS
OXYGEN SATURATION: 99 % | WEIGHT: 163 LBS | RESPIRATION RATE: 16 BRPM | BODY MASS INDEX: 23.39 KG/M2 | SYSTOLIC BLOOD PRESSURE: 100 MMHG | TEMPERATURE: 97.9 F | DIASTOLIC BLOOD PRESSURE: 72 MMHG | HEART RATE: 95 BPM

## 2022-06-13 DIAGNOSIS — E10.65 TYPE 1 DIABETES MELLITUS WITH HYPERGLYCEMIA (H): Primary | ICD-10-CM

## 2022-06-13 LAB — HBA1C MFR BLD: 9.2 % (ref 0–5.6)

## 2022-06-13 PROCEDURE — 36415 COLL VENOUS BLD VENIPUNCTURE: CPT | Performed by: INTERNAL MEDICINE

## 2022-06-13 PROCEDURE — 99214 OFFICE O/P EST MOD 30 MIN: CPT | Performed by: INTERNAL MEDICINE

## 2022-06-13 PROCEDURE — 99207 PR FOOT EXAM NO CHARGE: CPT | Performed by: INTERNAL MEDICINE

## 2022-06-13 PROCEDURE — 83036 HEMOGLOBIN GLYCOSYLATED A1C: CPT | Performed by: INTERNAL MEDICINE

## 2022-06-13 ASSESSMENT — PAIN SCALES - GENERAL: PAINLEVEL: MODERATE PAIN (4)

## 2022-06-13 NOTE — PATIENT INSTRUCTIONS
Lab work today:  We can do labs in the exam room today.    We need to check your sugars 2 hours after each meal to get an idea of what your post meal sugars are.  Email me back those sugars.    If these sugars are above 150, we'll need to dial up your novolog prescription.     Diabetes educator (CDE) will call you for an appointment.    You need to call endocrine for an appointment.    Harsh Dudley MD  Internal Medicine and Pediatrics

## 2022-06-13 NOTE — PROGRESS NOTES
Assessment & Plan     Type 1 diabetes mellitus with hyperglycemia (H)    Never called endo.  Re-referred.    Begin with diabetes educator (CDE) for help with omnipod and CGM.  He's a good pump candidate once he starts seeing endo.    Check post meal sugars more; email us back in 1 week with sugars.      Lantus working well for AM sugars; will need uptitration of his novolog.     Patient Instructions   Lab work today:  We can do labs in the exam room today.    We need to check your sugars 2 hours after each meal to get an idea of what your post meal sugars are.  Email me back those sugars.    If these sugars are above 150, we'll need to dial up your novolog prescription.     Diabetes educator (CDE) will call you for an appointment.    You need to call endocrine for an appointment.    Harsh Dudley MD  Internal Medicine and Pediatrics        - FOOT EXAM  - Hemoglobin A1c; Future  - Adult Endocrinology  Referral; Future  - [facility] Adult Diabetes Educator Referral; Future  - Hemoglobin A1c             See Patient Instructions    Return in about 3 months (around 9/13/2022) for diabetes followup, with me, in person.    Harsh Dudley MD  [facility] RAFFY Cazares is a 41 year old who presents for the following health issues     History of Present Illness       Back Pain:  He presents for follow up of back pain. Patient's back pain is a recurring problem.  Location of back pain:  Right lower back  Description of back pain: sharp  Back pain spreads: nowhere    Since patient first noticed back pain, pain is: unchanged  Does back pain interfere with his job:  Yes      He eats 0-1 servings of fruits and vegetables daily.He consumes 1 sweetened beverage(s) daily.He exercises with enough effort to increase his heart rate 10 to 19 minutes per day.  He exercises with enough effort to increase his heart rate 4 days per week. He is missing 1 dose(s) of medications per week.  He is not taking prescribed  medications regularly due to other.       Diabetes Follow-up    How often are you checking your blood sugar? Two times daily  Blood sugar testing frequency justification:  Adjustment of medication(s)  What time of day are you checking your blood sugars (select all that apply)?  Before meals  Have you had any blood sugars above 200?  Yes   Have you had any blood sugars below 70?  No    What symptoms do you notice when your blood sugar is low?  Weak    What concerns do you have today about your diabetes? None and Blood sugar is often over 200     Do you have any of these symptoms? (Select all that apply)  Numbness in feet and Blurry vision    Have you had a diabetic eye exam in the last 12 months? Yes-  Location: [facility] mary beth Avila:  Takes 36-40 units, depending onhis evening meals.    With meals taking 6-12 units with meals; usually breakfast and dinner takes a bit more.  Snacks at work.    Sugars runnins.  Before meals:  105, After meals.    ISS:              Hyperlipidemia Follow-Up      Are you regularly taking any medication or supplement to lower your cholesterol?   No    Are you having muscle aches or other side effects that you think could be caused by your cholesterol lowering medication?  No    Hypertension Follow-up      Do you check your blood pressure regularly outside of the clinic? No     Are you following a low salt diet? No    Are your blood pressures ever more than 140 on the top number (systolic) OR more   than 90 on the bottom number (diastolic), for example 140/90? No    BP Readings from Last 2 Encounters:   22 100/72   22 115/77     Hemoglobin A1C POCT (%)   Date Value   2021 10.0 (H)   12/15/2020 8.3 (H)     Hemoglobin A1C (%)   Date Value   2022 9.1 (H)     LDL Cholesterol Calculated (mg/dL)   Date Value   2022 50   12/15/2020 77   2019 69           Review of Systems   CONSTITUTIONAL: NEGATIVE for fever, chills, change in  weight  INTEGUMENTARY/SKIN: NEGATIVE for worrisome rashes, moles or lesions  EYES: NEGATIVE for vision changes or irritation  ENT/MOUTH: NEGATIVE for ear, mouth and throat problems  RESP: NEGATIVE for significant cough or SOB  BREAST: NEGATIVE for masses, tenderness or discharge  CV: NEGATIVE for chest pain, palpitations or peripheral edema  GI: NEGATIVE for nausea, abdominal pain, heartburn, or change in bowel habits  : NEGATIVE for frequency, dysuria, or hematuria  MUSCULOSKELETAL: NEGATIVE for significant arthralgias or myalgia  NEURO: NEGATIVE for weakness, dizziness or paresthesias  ENDOCRINE: NEGATIVE for temperature intolerance, skin/hair changes  HEME: NEGATIVE for bleeding problems  PSYCHIATRIC: NEGATIVE for changes in mood or affect      Objective    /72 (BP Location: Right arm, Patient Position: Sitting, Cuff Size: Adult Regular)   Pulse 95   Temp 97.9  F (36.6  C) (Tympanic)   Resp 16   Wt 73.9 kg (163 lb)   SpO2 99%   BMI 23.39 kg/m    Body mass index is 23.39 kg/m .  Physical Exam   GENERAL: healthy, alert and no distress  EYES: Eyes grossly normal to inspection, PERRL and conjunctivae and sclerae normal  HENT: ear canals and TM's normal, nose and mouth without ulcers or lesions  NECK: no adenopathy, no asymmetry, masses, or scars and thyroid normal to palpation  RESP: lungs clear to auscultation - no rales, rhonchi or wheezes  CV: regular rate and rhythm, normal S1 S2, no S3 or S4, no murmur, click or rub, no peripheral edema and peripheral pulses strong  ABDOMEN: soft, nontender, no hepatosplenomegaly, no masses and bowel sounds normal  MS: no gross musculoskeletal defects noted, no edema  SKIN: no suspicious lesions or rashes  NEURO: Normal strength and tone, mentation intact and speech normal  PSYCH: mentation appears normal, affect normal/bright  Diabetic foot exam: normal DP and PT pulses, no trophic changes or ulcerative lesions and normal sensory exam

## 2022-06-14 ENCOUNTER — TELEPHONE (OUTPATIENT)
Dept: PEDIATRICS | Facility: CLINIC | Age: 42
End: 2022-06-14
Payer: COMMERCIAL

## 2022-06-14 NOTE — TELEPHONE ENCOUNTER
Diabetes Education Scheduling Outreach #1:    Call to patient to schedule. Left message with phone number to call to schedule.    Also sent [employer] message for second attempt. Requested patient to call to schedule.    Debbie Sullivan OnCall  Diabetes and Nutrition Scheduling

## 2022-07-13 ENCOUNTER — TRANSFERRED RECORDS (OUTPATIENT)
Dept: HEALTH INFORMATION MANAGEMENT | Facility: CLINIC | Age: 42
End: 2022-07-13

## 2022-07-13 LAB — RETINOPATHY: POSITIVE

## 2022-12-26 ENCOUNTER — HEALTH MAINTENANCE LETTER (OUTPATIENT)
Age: 42
End: 2022-12-26

## 2023-04-16 ENCOUNTER — HEALTH MAINTENANCE LETTER (OUTPATIENT)
Age: 43
End: 2023-04-16

## 2023-07-06 DIAGNOSIS — E10.65 TYPE 1 DIABETES MELLITUS WITH HYPERGLYCEMIA (H): ICD-10-CM

## 2023-07-07 RX ORDER — INSULIN GLARGINE-YFGN 100 [IU]/ML
INJECTION, SOLUTION SUBCUTANEOUS
Qty: 45 ML | Refills: 3 | Status: SHIPPED | OUTPATIENT
Start: 2023-07-07

## 2023-07-09 ENCOUNTER — HEALTH MAINTENANCE LETTER (OUTPATIENT)
Age: 43
End: 2023-07-09

## 2023-07-19 ENCOUNTER — TRANSFERRED RECORDS (OUTPATIENT)
Dept: HEALTH INFORMATION MANAGEMENT | Facility: CLINIC | Age: 43
End: 2023-07-19
Payer: COMMERCIAL

## 2023-07-19 LAB — RETINOPATHY: POSITIVE

## 2023-08-07 DIAGNOSIS — E10.65 TYPE 1 DIABETES MELLITUS WITH HYPERGLYCEMIA (H): ICD-10-CM

## 2023-08-08 RX ORDER — INSULIN ASPART 100 [IU]/ML
INJECTION, SOLUTION INTRAVENOUS; SUBCUTANEOUS
Qty: 45 ML | Refills: 3 | Status: SHIPPED | OUTPATIENT
Start: 2023-08-08

## 2023-08-08 NOTE — TELEPHONE ENCOUNTER
Please call. Needs appointment within next 3 months.    Harsh Dudley MD  Internal Medicine and Pediatrics

## 2023-08-08 NOTE — TELEPHONE ENCOUNTER
Routing refill request to provider for review/approval because:  Labs not current:    Hemoglobin A1C   Date Value Ref Range Status   06/13/2022 9.2 (H) 0.0 - 5.6 % Final     Comment:     Normal <5.7%   Prediabetes 5.7-6.4%    Diabetes 6.5% or higher     Note: Adopted from ADA consensus guidelines.   04/13/2021 10.0 (H) 0 - 5.6 % Final     Comment:     Results confirmed by repeat test  Normal <5.7% Prediabetes 5.7-6.4%  Diabetes 6.5% or higher - adopted from ADA   consensus guidelines.  Reviewed: OK with previous       Creatinine   Date Value Ref Range Status   03/11/2022 0.81 0.66 - 1.25 mg/dL Final   12/15/2020 0.84 0.66 - 1.25 mg/dL Final     Due for appointment-please forward to TC to call for appointment    Marcy Yap RN

## 2023-11-03 ENCOUNTER — OFFICE VISIT (OUTPATIENT)
Dept: PEDIATRICS | Facility: CLINIC | Age: 43
End: 2023-11-03
Payer: COMMERCIAL

## 2023-11-03 VITALS
OXYGEN SATURATION: 97 % | SYSTOLIC BLOOD PRESSURE: 136 MMHG | WEIGHT: 161.2 LBS | HEART RATE: 104 BPM | TEMPERATURE: 97.6 F | BODY MASS INDEX: 23.13 KG/M2 | RESPIRATION RATE: 16 BRPM | DIASTOLIC BLOOD PRESSURE: 84 MMHG

## 2023-11-03 DIAGNOSIS — E10.65 TYPE 1 DIABETES MELLITUS WITH HYPERGLYCEMIA (H): Primary | ICD-10-CM

## 2023-11-03 LAB
ALBUMIN SERPL BCG-MCNC: 3.7 G/DL (ref 3.5–5.2)
ALP SERPL-CCNC: 102 U/L (ref 40–129)
ALT SERPL W P-5'-P-CCNC: 34 U/L (ref 0–70)
ANION GAP SERPL CALCULATED.3IONS-SCNC: 10 MMOL/L (ref 7–15)
AST SERPL W P-5'-P-CCNC: 17 U/L (ref 0–45)
BILIRUB SERPL-MCNC: 0.2 MG/DL
BUN SERPL-MCNC: 11.2 MG/DL (ref 6–20)
CALCIUM SERPL-MCNC: 8.8 MG/DL (ref 8.6–10)
CHLORIDE SERPL-SCNC: 105 MMOL/L (ref 98–107)
CHOLEST SERPL-MCNC: 108 MG/DL
CREAT SERPL-MCNC: 0.98 MG/DL (ref 0.67–1.17)
CREAT UR-MCNC: 86.2 MG/DL
DEPRECATED HCO3 PLAS-SCNC: 25 MMOL/L (ref 22–29)
EGFRCR SERPLBLD CKD-EPI 2021: >90 ML/MIN/1.73M2
GLUCOSE SERPL-MCNC: 412 MG/DL (ref 70–99)
HBA1C MFR BLD: 10.2 % (ref 0–5.6)
HDLC SERPL-MCNC: 35 MG/DL
LDLC SERPL CALC-MCNC: 55 MG/DL
MICROALBUMIN UR-MCNC: <12 MG/L
MICROALBUMIN/CREAT UR: NORMAL MG/G{CREAT}
NONHDLC SERPL-MCNC: 73 MG/DL
POTASSIUM SERPL-SCNC: 4.3 MMOL/L (ref 3.4–5.3)
PROT SERPL-MCNC: 6.5 G/DL (ref 6.4–8.3)
SODIUM SERPL-SCNC: 140 MMOL/L (ref 135–145)
TRIGL SERPL-MCNC: 89 MG/DL

## 2023-11-03 PROCEDURE — 90715 TDAP VACCINE 7 YRS/> IM: CPT | Performed by: INTERNAL MEDICINE

## 2023-11-03 PROCEDURE — 36415 COLL VENOUS BLD VENIPUNCTURE: CPT | Performed by: INTERNAL MEDICINE

## 2023-11-03 PROCEDURE — 82043 UR ALBUMIN QUANTITATIVE: CPT | Performed by: INTERNAL MEDICINE

## 2023-11-03 PROCEDURE — 99214 OFFICE O/P EST MOD 30 MIN: CPT | Mod: 25 | Performed by: INTERNAL MEDICINE

## 2023-11-03 PROCEDURE — 80053 COMPREHEN METABOLIC PANEL: CPT | Performed by: INTERNAL MEDICINE

## 2023-11-03 PROCEDURE — 91320 SARSCV2 VAC 30MCG TRS-SUC IM: CPT | Performed by: INTERNAL MEDICINE

## 2023-11-03 PROCEDURE — 90686 IIV4 VACC NO PRSV 0.5 ML IM: CPT | Performed by: INTERNAL MEDICINE

## 2023-11-03 PROCEDURE — 99207 PR FOOT EXAM NO CHARGE: CPT | Performed by: INTERNAL MEDICINE

## 2023-11-03 PROCEDURE — 83036 HEMOGLOBIN GLYCOSYLATED A1C: CPT | Performed by: INTERNAL MEDICINE

## 2023-11-03 PROCEDURE — 90472 IMMUNIZATION ADMIN EACH ADD: CPT | Performed by: INTERNAL MEDICINE

## 2023-11-03 PROCEDURE — 90480 ADMN SARSCOV2 VAC 1/ONLY CMP: CPT | Performed by: INTERNAL MEDICINE

## 2023-11-03 PROCEDURE — 82570 ASSAY OF URINE CREATININE: CPT | Performed by: INTERNAL MEDICINE

## 2023-11-03 PROCEDURE — 80061 LIPID PANEL: CPT | Performed by: INTERNAL MEDICINE

## 2023-11-03 PROCEDURE — 90471 IMMUNIZATION ADMIN: CPT | Performed by: INTERNAL MEDICINE

## 2023-11-03 RX ORDER — ATORVASTATIN CALCIUM 20 MG/1
20 TABLET, FILM COATED ORAL DAILY
Qty: 90 TABLET | Refills: 3 | Status: SHIPPED | OUTPATIENT
Start: 2023-11-03

## 2023-11-03 RX ORDER — ASPIRIN 81 MG/1
81 TABLET ORAL DAILY
COMMUNITY
Start: 2023-11-03

## 2023-11-03 ASSESSMENT — PATIENT HEALTH QUESTIONNAIRE - PHQ9
SUM OF ALL RESPONSES TO PHQ QUESTIONS 1-9: 4
SUM OF ALL RESPONSES TO PHQ QUESTIONS 1-9: 4
10. IF YOU CHECKED OFF ANY PROBLEMS, HOW DIFFICULT HAVE THESE PROBLEMS MADE IT FOR YOU TO DO YOUR WORK, TAKE CARE OF THINGS AT HOME, OR GET ALONG WITH OTHER PEOPLE: NOT DIFFICULT AT ALL

## 2023-11-03 ASSESSMENT — PAIN SCALES - GENERAL: PAINLEVEL: NO PAIN (0)

## 2023-11-03 NOTE — PROGRESS NOTES
Assessment & Plan     Type 1 diabetes mellitus with hyperglycemia (H)  Patient Instructions   Lab work today:  We can do labs in the exam room today, or you can get them done downstairs in the lab.        3 shots today.    Refilled meds, including atorvastatin.     Add back the aspirin daily, 81 mg.    Let's have you follow up with endocrine this winter.    Harsh Dudley MD  Internal Medicine and Pediatrics      - HEMOGLOBIN A1C; Future  - Lipid panel reflex to direct LDL Non-fasting; Future  - Albumin Random Urine Quantitative with Creat Ratio; Future  - aspirin 81 MG EC tablet; Take 1 tablet (81 mg) by mouth daily  - atorvastatin (LIPITOR) 20 MG tablet; Take 1 tablet (20 mg) by mouth daily  - Comprehensive metabolic panel (BMP + Alb, Alk Phos, ALT, AST, Total. Bili, TP); Future  - Adult Endocrinology  Referral; Future  - FOOT EXAM                 Harsh Dudley MD  [facility] RAFFY Cazares is a 42 year old, presenting for the following health issues:  Recheck Medication and Diabetes (/)        11/3/2023     1:04 PM   Additional Questions   Roomed by SORAIDA Stanton   Accompanied by n/a         11/3/2023     1:04 PM   Patient Reported Additional Medications   Patient reports taking the following new medications n/a           History of Present Illness       Diabetes:   He presents for follow up of diabetes.  He is checking home blood glucose two times daily.   He checks blood glucose before and after meals.  Blood glucose is sometimes over 200 and never under 70. He is aware of hypoglycemia symptoms including weakness.    He has no concerns regarding his diabetes at this time.  He is having numbness in feet and weight loss.            He eats 0-1 servings of fruits and vegetables daily.He consumes 2 sweetened beverage(s) daily.He exercises with enough effort to increase his heart rate 20 to 29 minutes per day.  He exercises with enough effort to increase his heart rate 5 days per  week.   He is taking medications regularly.     Sugars in AM: -130    Runs higher later in the day.  Running 150-190s.      35-40 Semglee at night.      Then 10+sliding scale, usually 15 units each meal    Last diabetes blood test (A1c) elevated to 9s.  Due today.  No longer seeing endocrinology.                   Review of Systems   CONSTITUTIONAL: NEGATIVE for fever, chills, change in weight  INTEGUMENTARY/SKIN: NEGATIVE for worrisome rashes, moles or lesions  EYES: NEGATIVE for vision changes or irritation  ENT/MOUTH: NEGATIVE for ear, mouth and throat problems  RESP: NEGATIVE for significant cough or SOB  BREAST: NEGATIVE for masses, tenderness or discharge  CV: NEGATIVE for chest pain, palpitations or peripheral edema  GI: NEGATIVE for nausea, abdominal pain, heartburn, or change in bowel habits  : NEGATIVE for frequency, dysuria, or hematuria  MUSCULOSKELETAL: NEGATIVE for significant arthralgias or myalgia  NEURO: NEGATIVE for weakness, dizziness or paresthesias  ENDOCRINE: NEGATIVE for temperature intolerance, skin/hair changes  HEME: NEGATIVE for bleeding problems  PSYCHIATRIC: NEGATIVE for changes in mood or affect      Objective    /84 (BP Location: Right arm, Patient Position: Sitting, Cuff Size: Adult Regular)   Pulse 104   Temp 97.6  F (36.4  C) (Oral)   Resp 16   Wt 73.1 kg (161 lb 3.2 oz)   SpO2 97%   BMI 23.13 kg/m    Body mass index is 23.13 kg/m .  Physical Exam   GENERAL: healthy, alert and no distress  EYES: Eyes grossly normal to inspection, PERRL and conjunctivae and sclerae normal  HENT: ear canals and TM's normal, nose and mouth without ulcers or lesions  NECK: no adenopathy, no asymmetry, masses, or scars and thyroid normal to palpation  RESP: lungs clear to auscultation - no rales, rhonchi or wheezes  CV: regular rate and rhythm, normal S1 S2, no S3 or S4, no murmur, click or rub, no peripheral edema and peripheral pulses strong  ABDOMEN: soft, nontender, no  hepatosplenomegaly, no masses and bowel sounds normal  MS: no gross musculoskeletal defects noted, no edema  SKIN: no suspicious lesions or rashes  NEURO: Normal strength and tone, mentation intact and speech normal  PSYCH: mentation appears normal, affect normal/bright

## 2023-11-03 NOTE — PATIENT INSTRUCTIONS
Lab work today:  We can do labs in the exam room today, or you can get them done downstairs in the lab.        3 shots today.    Refilled meds, including atorvastatin.     Add back the aspirin daily, 81 mg.    Let's have you follow up with endocrine this winter.    Harsh Dudley MD  Internal Medicine and Pediatrics

## 2023-11-03 NOTE — LETTER
November 3, 2023      Sage Luna  [id number] [facility] [id number]        Sage,     Your diabetes blood test (A1c) has risen again, now to 10.2     I think you need to see the endocrinologist at this point, and maybe even consider control with an insulin pump (or a disposable version) for improved control.     Please call endocrine and try to get in ASAP, if you would.      In the meantime, I would experiment with taking 42 units of the Semglee, then 44, then 46, etc, until your AM sugars are consistently below 100.  Also, I'd add another unit to each correction scale with meals, trying to keep your post meal sugars below 150.     It was good seeing you in the office.  Hope you have a great rest of the day!     Harsh Dudley MD   Internal Medicine and Pediatrics     Resulted Orders   HEMOGLOBIN A1C   Result Value Ref Range    Hemoglobin A1C 10.2 (H) 0.0 - 5.6 %      Comment:      Normal <5.7%   Prediabetes 5.7-6.4%    Diabetes 6.5% or higher     Note: Adopted from ADA consensus guidelines.    Narrative    Results confirmed with repeat testing.

## 2024-02-07 ENCOUNTER — TRANSFERRED RECORDS (OUTPATIENT)
Dept: HEALTH INFORMATION MANAGEMENT | Facility: CLINIC | Age: 44
End: 2024-02-07
Payer: COMMERCIAL

## 2024-02-07 LAB — RETINOPATHY: POSITIVE

## 2024-05-02 SDOH — HEALTH STABILITY: PHYSICAL HEALTH: ON AVERAGE, HOW MANY MINUTES DO YOU ENGAGE IN EXERCISE AT THIS LEVEL?: 30 MIN

## 2024-05-02 SDOH — HEALTH STABILITY: PHYSICAL HEALTH: ON AVERAGE, HOW MANY DAYS PER WEEK DO YOU ENGAGE IN MODERATE TO STRENUOUS EXERCISE (LIKE A BRISK WALK)?: 2 DAYS

## 2024-05-02 ASSESSMENT — PATIENT HEALTH QUESTIONNAIRE - PHQ9
SUM OF ALL RESPONSES TO PHQ QUESTIONS 1-9: 6
SUM OF ALL RESPONSES TO PHQ QUESTIONS 1-9: 6
10. IF YOU CHECKED OFF ANY PROBLEMS, HOW DIFFICULT HAVE THESE PROBLEMS MADE IT FOR YOU TO DO YOUR WORK, TAKE CARE OF THINGS AT HOME, OR GET ALONG WITH OTHER PEOPLE: NOT DIFFICULT AT ALL

## 2024-05-02 ASSESSMENT — SOCIAL DETERMINANTS OF HEALTH (SDOH): HOW OFTEN DO YOU GET TOGETHER WITH FRIENDS OR RELATIVES?: ONCE A WEEK

## 2024-05-03 ENCOUNTER — OFFICE VISIT (OUTPATIENT)
Dept: PEDIATRICS | Facility: CLINIC | Age: 44
End: 2024-05-03
Attending: INTERNAL MEDICINE
Payer: COMMERCIAL

## 2024-05-03 VITALS
WEIGHT: 155.44 LBS | SYSTOLIC BLOOD PRESSURE: 128 MMHG | RESPIRATION RATE: 16 BRPM | HEIGHT: 68 IN | BODY MASS INDEX: 23.56 KG/M2 | TEMPERATURE: 97.4 F | HEART RATE: 88 BPM | DIASTOLIC BLOOD PRESSURE: 86 MMHG | OXYGEN SATURATION: 99 %

## 2024-05-03 DIAGNOSIS — Z00.00 ROUTINE GENERAL MEDICAL EXAMINATION AT A HEALTH CARE FACILITY: Primary | ICD-10-CM

## 2024-05-03 DIAGNOSIS — E55.9 VITAMIN D DEFICIENCY: ICD-10-CM

## 2024-05-03 DIAGNOSIS — E10.65 TYPE 1 DIABETES MELLITUS WITH HYPERGLYCEMIA (H): ICD-10-CM

## 2024-05-03 LAB — HBA1C MFR BLD: 10.2 % (ref 0–5.6)

## 2024-05-03 PROCEDURE — 99396 PREV VISIT EST AGE 40-64: CPT | Performed by: INTERNAL MEDICINE

## 2024-05-03 PROCEDURE — 36415 COLL VENOUS BLD VENIPUNCTURE: CPT | Performed by: INTERNAL MEDICINE

## 2024-05-03 PROCEDURE — 84443 ASSAY THYROID STIM HORMONE: CPT | Performed by: INTERNAL MEDICINE

## 2024-05-03 PROCEDURE — 82306 VITAMIN D 25 HYDROXY: CPT | Performed by: INTERNAL MEDICINE

## 2024-05-03 PROCEDURE — 83036 HEMOGLOBIN GLYCOSYLATED A1C: CPT | Performed by: INTERNAL MEDICINE

## 2024-05-03 PROCEDURE — 99213 OFFICE O/P EST LOW 20 MIN: CPT | Mod: 25 | Performed by: INTERNAL MEDICINE

## 2024-05-03 RX ORDER — AMOXICILLIN 500 MG/1
TABLET, FILM COATED ORAL
COMMUNITY
Start: 2024-01-14 | End: 2024-08-27

## 2024-05-03 ASSESSMENT — PAIN SCALES - GENERAL: PAINLEVEL: NO PAIN (0)

## 2024-05-03 NOTE — PATIENT INSTRUCTIONS
"SHingrix at age 49.    Colonoscopy next year.    Lab work today:  We can do labs in the exam room today, or you can get them done downstairs in the lab.      If you are going downstairs:  Directions:  As you walk through the first floor, you'll see (on the right) first the pharmacy, then some bathrooms, then the \"Lab and Imaging\" area. Give them your name at the window there and wait for them to call you.     If your diabetes blood test (A1c) is still high, I will refer you to [facility] pharmacy for adjustment.  THey will call you.          Preventive Care Advice   This is general advice given by our system to help you stay healthy. However, your care team may have specific advice just for you. Please talk to your care team about your preventive care needs.  Nutrition  Eat 5 or more servings of fruits and vegetables each day.  Try wheat bread, brown rice and whole grain pasta (instead of white bread, rice, and pasta).  Get enough calcium and vitamin D. Check the label on foods and aim for 100% of the RDA (recommended daily allowance).  Lifestyle  Exercise at least 150 minutes each week   (30 minutes a day, 5 days a week).  Do muscle strengthening activities 2 days a week. These help control your weight and prevent disease.  No smoking.  Wear sunscreen to prevent skin cancer.  Have a dental exam and cleaning every 6 months.  Yearly exams  See your health care team every year to talk about:  Any changes in your health.  Any medicines your care team has prescribed.  Preventive care, family planning, and ways to prevent chronic diseases.  Shots (vaccines)   HPV shots (up to age 26), if you've never had them before.  Hepatitis B shots (up to age 59), if you've never had them before.  COVID-19 shot: Get this shot when it's due.  Flu shot: Get a flu shot every year.  Tetanus shot: Get a tetanus shot every 10 years.  Pneumococcal, hepatitis A, and RSV shots: Ask your care team if you need these based on your risk.  Shingles shot " (for age 50 and up).  General health tests  Diabetes screening:  Starting at age 35, Get screened for diabetes at least every 3 years.  If you are younger than age 35, ask your care team if you should be screened for diabetes.  Cholesterol test: At age 39, start having a cholesterol test every 5 years, or more often if advised.  Bone density scan (DEXA): At age 50, ask your care team if you should have this scan for osteoporosis (brittle bones).  Hepatitis C: Get tested at least once in your life.  STIs (sexually transmitted infections)  Before age 24: Ask your care team if you should be screened for STIs.  After age 24: Get screened for STIs if you're at risk. You are at risk for STIs (including HIV) if:  You are sexually active with more than one person.  You don't use condoms every time.  You or a partner was diagnosed with a sexually transmitted infection.  If you are at risk for HIV, ask about PrEP medicine to prevent HIV.  Get tested for HIV at least once in your life, whether you are at risk for HIV or not.  Cancer screening tests  Cervical cancer screening: If you have a cervix, begin getting regular cervical cancer screening tests at age 21. Most people who have regular screenings with normal results can stop after age 65. Talk about this with your provider.  Breast cancer scan (mammogram): If you've ever had breasts, begin having regular mammograms starting at age 40. This is a scan to check for breast cancer.  Colon cancer screening: It is important to start screening for colon cancer at age 45.  Have a colonoscopy test every 10 years (or more often if you're at risk) Or, ask your provider about stool tests like a FIT test every year or Cologuard test every 3 years.  To learn more about your testing options, visit: https://[email or web address]/193820.pdf.  For help making a decision, visit: https://bit.ly/wn71223.  Prostate cancer screening test: If you have a prostate and are age 55 to 69, ask your provider  if you would benefit from a yearly prostate cancer screening test.  Lung cancer screening: If you are a current or former smoker age 50 to 80, ask your care team if ongoing lung cancer screenings are right for you.  For informational purposes only. Not to replace the advice of your health care provider. Copyright   2023 [employer]. All rights reserved. Clinically reviewed by the [facility]. [employer] [id number] - REV 01/24.    Learning About Stress  What is stress?     Stress is your body's response to a hard situation. Your body can have a physical, emotional, or mental response. Stress is a fact of life for most people, and it affects everyone differently. What causes stress for you may not be stressful for someone else.  A lot of things can cause stress. You may feel stress when you go on a job interview, take a test, or run a race. This kind of short-term stress is normal and even useful. It can help you if you need to work hard or react quickly. For example, stress can help you finish an important job on time.  Long-term stress is caused by ongoing stressful situations or events. Examples of long-term stress include long-term health problems, ongoing problems at work, or conflicts in your family. Long-term stress can harm your health.  How does stress affect your health?  When you are stressed, your body responds as though you are in danger. It makes hormones that speed up your heart, make you breathe faster, and give you a burst of energy. This is called the fight-or-flight stress response. If the stress is over quickly, your body goes back to normal and no harm is done.  But if stress happens too often or lasts too long, it can have bad effects. Long-term stress can make you more likely to get sick, and it can make symptoms of some diseases worse. If you tense up when you are stressed, you may develop neck, shoulder, or low back pain. Stress is linked to high blood  pressure and heart disease.  Stress also harms your emotional health. It can make you adams, tense, or depressed. Your relationships may suffer, and you may not do well at work or school.  What can you do to manage stress?  You can try these things to help manage stress:   Do something active. Exercise or activity can help reduce stress. Walking is a great way to get started. Even everyday activities such as housecleaning or yard work can help.  Try yoga or opal chi. These techniques combine exercise and meditation. You may need some training at first to learn them.  Do something you enjoy. For example, listen to music or go to a movie. Practice your hobby or do volunteer work.  Meditate. This can help you relax, because you are not worrying about what happened before or what may happen in the future.  Do guided imagery. Imagine yourself in any setting that helps you feel calm. You can use online videos, books, or a teacher to guide you.  Do breathing exercises. For example:  From a standing position, bend forward from the waist with your knees slightly bent. Let your arms dangle close to the floor.  Breathe in slowly and deeply as you return to a standing position. Roll up slowly and lift your head last.  Hold your breath for just a few seconds in the standing position.  Breathe out slowly and bend forward from the waist.  Let your feelings out. Talk, laugh, cry, and express anger when you need to. Talking with supportive friends or family, a counselor, or a peyman leader about your feelings is a healthy way to relieve stress. Avoid discussing your feelings with people who make you feel worse.  Write. It may help to write about things that are bothering you. This helps you find out how much stress you feel and what is causing it. When you know this, you can find better ways to cope.  What can you do to prevent stress?  You might try some of these things to help prevent stress:  Manage your time. This helps you find  "time to do the things you want and need to do.  Get enough sleep. Your body recovers from the stresses of the day while you are sleeping.  Get support. Your family, friends, and community can make a difference in how you experience stress.  Limit your news feed. Avoid or limit time on social media or news that may make you feel stressed.  Do something active. Exercise or activity can help reduce stress. Walking is a great way to get started.  Where can you learn more?  Go to https://www.[employer].net/patiented  Enter N032 in the search box to learn more about \"Learning About Stress.\"  Current as of: October 24, 2023               Content Version: 14.0    9014-0189 [employer].   Care instructions adapted under license by your healthcare professional. If you have questions about a medical condition or this instruction, always ask your healthcare professional. [employer] disclaims any warranty or liability for your use of this information.      Learning About Depression Screening  What is depression screening?  Depression screening is a way to see if you have depression symptoms. It may be done by a doctor or counselor. It's often part of a routine checkup. That's because your mental health is just as important as your physical health.  Depression is a mental health condition that affects how you feel, think, and act. You may:  Have less energy.  Lose interest in your daily activities.  Feel sad and grouchy for a long time.  Depression is very common. It affects people of all ages.  Many things can lead to depression. Some people become depressed after they have a stroke or find out they have a major illness like cancer or heart disease. The death of a loved one or a breakup may lead to depression. It can run in families. Most experts believe that a combination of inherited genes and stressful life events can cause it.  What happens during screening?  You may be asked to fill out a form " "about your depression symptoms. You and the doctor will discuss your answers. The doctor may ask you more questions to learn more about how you think, act, and feel.  What happens after screening?  If you have symptoms of depression, your doctor will talk to you about your options.  Doctors usually treat depression with medicines or counseling. Often, combining the two works best. Many people don't get help because they think that they'll get over the depression on their own. But people with depression may not get better unless they get treatment.  The cause of depression is not well understood. There may be many factors involved. But if you have depression, it's not your fault.  A serious symptom of depression is thinking about death or suicide. If you or someone you care about talks about this or about feeling hopeless, get help right away.  It's important to know that depression can be treated. Medicine, counseling, and self-care may help.  Where can you learn more?  Go to https://www.[employer].net/patiented  Enter T185 in the search box to learn more about \"Learning About Depression Screening.\"  Current as of: June 24, 2023               Content Version: 14.0    5220-4156 [employer].   Care instructions adapted under license by your healthcare professional. If you have questions about a medical condition or this instruction, always ask your healthcare professional. [employer] disclaims any warranty or liability for your use of this information.      "

## 2024-05-03 NOTE — PROGRESS NOTES
Preventive Care Visit  [facility] RAFFY Dudley MD, Internal Medicine - Pediatrics  May 3, 2024      Assessment & Plan     Type 1 diabetes mellitus with hyperglycemia (H)  If diabetes blood test (A1c) looks high,  we should refer to MTM.  Other Parameters well controlled.  Continue secondary risk factor modification for BP, cholesterol, anticoagulation, and smoking cessation.   - HEMOGLOBIN A1C; Future  - TSH with free T4 reflex; Future  - HEMOGLOBIN A1C  - TSH with free T4 reflex    Vitamin D deficiency  Labs today.   - Vitamin D Deficiency; Future  - Vitamin D Deficiency    Routine general medical examination at a health care facility  Discussed diet, exercise, testicular self exam, blood pressure, cholesterol, and need for cancer surveillance at appropriate ages.               Counseling  Appropriate preventive services were discussed with this patient, including applicable screening as appropriate for fall prevention, nutrition, physical activity, Tobacco-use cessation, weight loss and cognition.  Checklist reviewing preventive services available has been given to the patient.  Reviewed patient's diet, addressing concerns and/or questions.   He is at risk for lack of exercise and has been provided with information to increase physical activity for the benefit of his well-being.   The patient was instructed to see the dentist every 6 months.   The patient's PHQ-9 score is consistent with mild depression. He was provided with information regarding depression.           Paty Cazares is a 43 year old, presenting for the following:  Physical        5/3/2024     7:03 AM   Additional Questions   Roomed by Marietta Moreno CMA   Accompanied by N/A         5/3/2024     7:03 AM   Patient Reported Additional Medications   Patient reports taking the following new medications N/A          HPI    AM sugars: running 100s.   During daytime at [place]: [id number]. Often will check before meals, but then will  "check after meals, and if high will just \"try to be more active.\"                5/2/2024   General Health   How would you rate your overall physical health? (!) FAIR   Feel stress (tense, anxious, or unable to sleep) To some extent   (!) STRESS CONCERN      5/2/2024   Nutrition   Three or more servings of calcium each day? Yes   Diet: Diabetic   How many servings of fruit and vegetables per day? (!) 0-1   How many sweetened beverages each day? (!) 2         5/2/2024   Exercise   Days per week of moderate/strenous exercise 2 days   Average minutes spent exercising at this level 30 min   (!) EXERCISE CONCERN      5/2/2024   Social Factors   Frequency of gathering with friends or relatives Once a week   Worry food won't last until get money to buy more No   Food not last or not have enough money for food? No   Do you have housing?  Yes   Are you worried about losing your housing? No   Lack of transportation? No   Unable to get utilities (heat,electricity)? No         5/2/2024   Dental   Dentist two times every year? (!) NO         5/2/2024   TB Screening   Were you born outside of the [place]? No       Today's PHQ-9 Score:       5/2/2024     8:06 PM   PHQ-9 SCORE   PHQ-9 Total Score MyChart 6 (Mild depression)   PHQ-9 Total Score 6         5/2/2024   Substance Use   Alcohol more than 3/day or more than 7/wk No   Do you use any other substances recreationally? No     Social History     Tobacco Use    Smoking status: Never    Smokeless tobacco: Never   Vaping Use    Vaping status: Never Used   Substance Use Topics    Alcohol use: Yes     Comment: seldom    Drug use: No           5/2/2024   STI Screening   New sexual partner(s) since last STI/HIV test? No   ASCVD Risk   The ASCVD Risk score (Shannan THOMAS, et al., 2019) failed to calculate for the following reasons:    The valid total cholesterol range is 130 to 320 mg/dL        5/2/2024   Contraception/Family Planning   Questions about contraception or family planning " "No        Reviewed and updated as needed this visit by Provider                    Past Medical History:   Diagnosis Date    Diabetes (H)     Lyme arthritis (H)     Lyme disease      Past Surgical History:   Procedure Laterality Date    ARTHROSCOPY KNEE Right 3/25/2015    Procedure: ARTHROSCOPY KNEE;  Surgeon: Te Mcfarland MD;  Location: [facility] OR    ARTHROSCOPY KNEE WITH LATERAL MENISCECTOMY  9/4/2014    Procedure: ARTHROSCOPY KNEE WITH LATERAL MENISCECTOMY;  Surgeon: Te Mcfarland MD;  Location: [facility] OR    OPEN REDUCTION INTERNAL FIXATION FEMUR DISTAL Right 9/4/2014    Procedure: OPEN REDUCTION INTERNAL FIXATION FEMUR DISTAL;  Surgeon: Te Mcfarland MD;  Location: [facility] OR    REMOVE HARDWARE KNEE Right 3/25/2015    Procedure: REMOVE HARDWARE KNEE;  Surgeon: Te Mcfarland MD;  Location: [facility] OR         Review of Systems  Constitutional, HEENT, cardiovascular, pulmonary, GI, , musculoskeletal, neuro, skin, endocrine and psych systems are negative, except as otherwise noted.     Objective    Exam  There were no vitals taken for this visit.   Estimated body mass index is 23.13 kg/m  as calculated from the following:    Height as of 3/11/22: 1.778 m (5' 10\").    Weight as of 11/3/23: 73.1 kg (161 lb 3.2 oz).    Physical Exam  GENERAL: alert and no distress  EYES: Eyes grossly normal to inspection, PERRL and conjunctivae and sclerae normal  HENT: ear canals and TM's normal, nose and mouth without ulcers or lesions  NECK: no adenopathy, no asymmetry, masses, or scars  RESP: lungs clear to auscultation - no rales, rhonchi or wheezes  CV: regular rate and rhythm, normal S1 S2, no S3 or S4, no murmur, click or rub, no peripheral edema  ABDOMEN: soft, nontender, no hepatosplenomegaly, no masses and bowel sounds normal  MS: no gross musculoskeletal defects noted, no edema  SKIN: no suspicious lesions or rashes  NEURO: Normal strength and tone, mentation intact and speech normal  PSYCH: mentation appears normal, " affect normal/bright  Diabetic foot exam: normal DP and PT pulses, no trophic changes or ulcerative lesions, and normal sensory exam        Signed Electronically by: Harsh Dudley MD

## 2024-05-04 LAB
TSH SERPL DL<=0.005 MIU/L-ACNC: 1.73 UIU/ML (ref 0.3–4.2)
VIT D+METAB SERPL-MCNC: 8 NG/ML (ref 20–50)

## 2024-05-08 ENCOUNTER — TELEPHONE (OUTPATIENT)
Dept: PEDIATRICS | Facility: CLINIC | Age: 44
End: 2024-05-08
Payer: COMMERCIAL

## 2024-05-08 NOTE — TELEPHONE ENCOUNTER
MTM referral from: [facility] visit (referral by provider)    MTM referral outreach attempt #2 on May 8, 2024 at 2:40 PM      Outcome: Spoke with patient 2nd call attempt- pt was at work and will call us back at a later time    Use private pay for the carrier/Plan on the flowsheet        Jaycee Granger [facility]    [phone]

## 2024-07-24 ENCOUNTER — TRANSFERRED RECORDS (OUTPATIENT)
Dept: HEALTH INFORMATION MANAGEMENT | Facility: CLINIC | Age: 44
End: 2024-07-24
Payer: COMMERCIAL

## 2024-07-26 DIAGNOSIS — E10.65 TYPE 1 DIABETES MELLITUS WITH HYPERGLYCEMIA (H): ICD-10-CM

## 2024-07-26 RX ORDER — FLURBIPROFEN SODIUM 0.3 MG/ML
SOLUTION/ DROPS OPHTHALMIC
Qty: 400 EACH | Refills: 0 | Status: SHIPPED | OUTPATIENT
Start: 2024-07-26

## 2024-07-26 NOTE — ED TRIAGE NOTES
"Pt c/o left sided facial \"nerve\" and jaw pain, states that it can \"cause a headache as well.\" States pain has been ongoing for the past 3 days. Pt alert, oriented x3 ABCs intact  " 108

## 2024-08-26 ASSESSMENT — PATIENT HEALTH QUESTIONNAIRE - PHQ9
10. IF YOU CHECKED OFF ANY PROBLEMS, HOW DIFFICULT HAVE THESE PROBLEMS MADE IT FOR YOU TO DO YOUR WORK, TAKE CARE OF THINGS AT HOME, OR GET ALONG WITH OTHER PEOPLE: NOT DIFFICULT AT ALL
SUM OF ALL RESPONSES TO PHQ QUESTIONS 1-9: 0
SUM OF ALL RESPONSES TO PHQ QUESTIONS 1-9: 0

## 2024-08-27 ENCOUNTER — OFFICE VISIT (OUTPATIENT)
Dept: PEDIATRICS | Facility: CLINIC | Age: 44
End: 2024-08-27
Payer: COMMERCIAL

## 2024-08-27 VITALS
OXYGEN SATURATION: 98 % | SYSTOLIC BLOOD PRESSURE: 121 MMHG | HEIGHT: 69 IN | HEART RATE: 97 BPM | WEIGHT: 160 LBS | TEMPERATURE: 98.1 F | BODY MASS INDEX: 23.7 KG/M2 | RESPIRATION RATE: 16 BRPM | DIASTOLIC BLOOD PRESSURE: 82 MMHG

## 2024-08-27 DIAGNOSIS — M80.00XA OSTEOPOROSIS WITH CURRENT PATHOLOGICAL FRACTURE, UNSPECIFIED OSTEOPOROSIS TYPE, INITIAL ENCOUNTER: ICD-10-CM

## 2024-08-27 DIAGNOSIS — K21.9 GASTROESOPHAGEAL REFLUX DISEASE WITHOUT ESOPHAGITIS: ICD-10-CM

## 2024-08-27 DIAGNOSIS — E55.9 VITAMIN D DEFICIENCY: ICD-10-CM

## 2024-08-27 DIAGNOSIS — S22.080D CLOSED WEDGE COMPRESSION FRACTURE OF T12 VERTEBRA WITH ROUTINE HEALING, SUBSEQUENT ENCOUNTER: Primary | ICD-10-CM

## 2024-08-27 DIAGNOSIS — R93.0 ABNORMAL CT OF THE HEAD: ICD-10-CM

## 2024-08-27 PROCEDURE — 99214 OFFICE O/P EST MOD 30 MIN: CPT | Performed by: PHYSICIAN ASSISTANT

## 2024-08-27 RX ORDER — CALCITONIN SALMON 200 [IU]/.09ML
200 SPRAY, METERED NASAL DAILY
COMMUNITY
Start: 2024-08-19

## 2024-08-27 RX ORDER — ACETAMINOPHEN 500 MG
1000 TABLET ORAL EVERY 6 HOURS PRN
COMMUNITY
Start: 2024-08-19

## 2024-08-27 RX ORDER — CHOLECALCIFEROL (VITAMIN D3) 50 MCG
50 TABLET ORAL DAILY
COMMUNITY
Start: 2024-08-19

## 2024-08-27 RX ORDER — IBUPROFEN 200 MG
500 CAPSULE ORAL DAILY
COMMUNITY
Start: 2024-08-19

## 2024-08-27 RX ORDER — FAMOTIDINE 20 MG/1
20 TABLET, FILM COATED ORAL 2 TIMES DAILY
Qty: 30 TABLET | Refills: 0 | Status: SHIPPED | OUTPATIENT
Start: 2024-08-27

## 2024-08-27 RX ORDER — ERGOCALCIFEROL 1.25 MG/1
50000 CAPSULE, LIQUID FILLED ORAL WEEKLY
Qty: 8 CAPSULE | Refills: 0 | Status: SHIPPED | OUTPATIENT
Start: 2024-08-27

## 2024-08-27 RX ORDER — OXYCODONE HYDROCHLORIDE 5 MG/1
5 TABLET ORAL EVERY 6 HOURS PRN
COMMUNITY
Start: 2024-08-19 | End: 2024-10-03

## 2024-08-27 ASSESSMENT — PAIN SCALES - GENERAL: PAINLEVEL: MODERATE PAIN (5)

## 2024-08-27 NOTE — PATIENT INSTRUCTIONS
Follow up with endo, neurosurg and MRI brain  Start vitamin D weekly as prescribed  May take pepcid to see if this helps with reflux in the short term

## 2024-08-27 NOTE — PROGRESS NOTES
Assessment & Plan   Closed wedge compression fracture of T12 vertebra with routine healing, subsequent encounter  Patient to follow up with neurosurg regarding management and beginning physical therapy and occupational therapy.  - Adult Neurosurgery  Referral; Future    Osteoporosis with current pathological fracture, unspecified osteoporosis type, initial encounter  Referral to endo for further evaluation and treatment.  - Adult Endocrinology  Referral; Future    Vitamin D deficiency  Begin vitamin D weekly. Follow up with endo.  - vitamin D2 (ERGOCALCIFEROL) 90347 units (1250 mcg) capsule; Take 1 capsule (50,000 Units) by mouth once a week.    Abnormal CT of the head  Obtain MRI brain for further evaluation.  - MR Brain w/o & w Contrast; Future    Gastroesophageal reflux disease without esophagitis  Begin pepcid twice daily for symptoms.   - famotidine (PEPCID) 20 MG tablet; Take 1 tablet (20 mg) by mouth 2 times daily.    FMLA forms were completed for both the patient and wife, Camelia (as his caretaker).    Paty Cazares is a 43 year old, presenting for the following health issues:  [facility]   Hospital Follow-up Visit:    Hospital/Nursing Home/ Rehab Facility:  [facility]   Date of Admission: 08/17/2024  Date of Discharge: 08/19/2024  Reason(s) for Admission: Fracture T11-12 Wedge Compression Closed Initial HCC  Was the patient in the ICU or did the patient experience delirium during hospitalization?  No  Do you have any other stressors you would like to discuss with your provider? OTHER: hiccup concern     Problems taking medications regularly:  None  Medication changes since discharge: None  Problems adhering to non-medication therapy:  None    Summary of hospitalization:  [facility] discharge summary reviewed  Diagnostic Tests/Treatments reviewed.  Follow up needed: endo, neurosurg, radiology imaging  Other Healthcare Providers Involved in  "Patient s Care:         Specialist appointment - needs to be scheduled and Imaging  Update since discharge: improved.     Plan of care communicated with patient    CT head reveals an abnormality. Patient needs MRI brain with and without contrast   Vitamin D deficiency  Osteoporosis-patient on no treatment.   Fracture of T11-T12. In brace  Patient needs FMLA for himself and wife, Camelia.     Review of Systems  Constitutional, HEENT, cardiovascular, pulmonary, gi and gu systems are negative, except as otherwise noted.      Objective    /82 (BP Location: Right arm, Patient Position: Sitting, Cuff Size: Adult Regular)   Pulse 97   Temp 98.1  F (36.7  C) (Tympanic)   Resp 16   Ht 1.753 m (5' 9\")   Wt 72.6 kg (160 lb)   SpO2 98%   BMI 23.63 kg/m    Body mass index is 23.63 kg/m .  Physical Exam   GENERAL: alert and no distress  EYES: Eyes grossly normal to inspection, PERRL and conjunctivae and sclerae normal  RESP: lungs clear to auscultation - no rales, rhonchi or wheezes  CV: regular rate and rhythm, normal S1 S2, no S3 or S4    No results found for any visits on 08/27/24.        Signed Electronically by: Nakia Reyes PA-C    "

## 2024-09-24 ENCOUNTER — OFFICE VISIT (OUTPATIENT)
Dept: PEDIATRICS | Facility: CLINIC | Age: 44
End: 2024-09-24
Payer: COMMERCIAL

## 2024-09-24 VITALS
HEIGHT: 69 IN | RESPIRATION RATE: 16 BRPM | HEART RATE: 100 BPM | BODY MASS INDEX: 21.61 KG/M2 | OXYGEN SATURATION: 99 % | DIASTOLIC BLOOD PRESSURE: 81 MMHG | SYSTOLIC BLOOD PRESSURE: 130 MMHG | TEMPERATURE: 99.3 F | WEIGHT: 145.9 LBS

## 2024-09-24 DIAGNOSIS — R93.0 ABNORMAL CT OF THE HEAD: ICD-10-CM

## 2024-09-24 DIAGNOSIS — M80.00XA OSTEOPOROSIS WITH CURRENT PATHOLOGICAL FRACTURE, UNSPECIFIED OSTEOPOROSIS TYPE, INITIAL ENCOUNTER: ICD-10-CM

## 2024-09-24 DIAGNOSIS — E55.9 VITAMIN D DEFICIENCY: ICD-10-CM

## 2024-09-24 DIAGNOSIS — S22.080D CLOSED WEDGE COMPRESSION FRACTURE OF T12 VERTEBRA WITH ROUTINE HEALING, SUBSEQUENT ENCOUNTER: Primary | ICD-10-CM

## 2024-09-24 DIAGNOSIS — Z23 NEED FOR PROPHYLACTIC VACCINATION AND INOCULATION AGAINST INFLUENZA: ICD-10-CM

## 2024-09-24 PROCEDURE — 90480 ADMN SARSCOV2 VAC 1/ONLY CMP: CPT | Performed by: PHYSICIAN ASSISTANT

## 2024-09-24 PROCEDURE — 90471 IMMUNIZATION ADMIN: CPT | Performed by: PHYSICIAN ASSISTANT

## 2024-09-24 PROCEDURE — 99214 OFFICE O/P EST MOD 30 MIN: CPT | Mod: 25 | Performed by: PHYSICIAN ASSISTANT

## 2024-09-24 PROCEDURE — 90656 IIV3 VACC NO PRSV 0.5 ML IM: CPT | Performed by: PHYSICIAN ASSISTANT

## 2024-09-24 PROCEDURE — 91320 SARSCV2 VAC 30MCG TRS-SUC IM: CPT | Performed by: PHYSICIAN ASSISTANT

## 2024-09-24 NOTE — PROGRESS NOTES
"  Assessment & Plan     Closed wedge compression fracture of T12 vertebra with routine healing, subsequent encounter  Healing based on symptoms. Would like input from neurosurg on RTW guidelines.     Osteoporosis with current pathological fracture, unspecified osteoporosis type, initial encounter  Follow up with endo.    Vitamin D deficiency      Abnormal CT of the head  Obtain MRI at your convenience.     Need for prophylactic vaccination and inoculation against influenza  Updated today.      Subjective   Sage is a 43 year old, presenting for the following health issues:      History of Present Illness       Back Pain:  He presents for follow up of back pain. Patient's back pain is a recurring problem.  Location of back pain:  Right lower back and left lower back  Description of back pain: dull ache  Back pain spreads: nowhere    Since patient first noticed back pain, pain is: gradually improving  Does back pain interfere with his job:  Not applicable      He is taking medications regularly.  Patient here for follow up. He sustained a fracture of the thoracic spine on 8/17. He has been wearing a brace and feeling improved and more active.     Encouraged patient to follow up with neurosurg and starting physical therapy and occ therapy. He has not contacted them.     Encouraged patient to follow up with endo given his history of diabetes type 1, osteoporosis, and vit d defciency. He has has not contacted them.     Patient needs a follow up MRI brain which he has held off on due to positioning in the MRI machine.     Patient has not returned to work as a .      Review of Systems  Constitutional, neuro, ENT, endocrine, pulmonary, cardiac, gastrointestinal, genitourinary, musculoskeletal, integument and psychiatric systems are negative, except as otherwise noted.      Objective    /81   Pulse 100   Temp 99.3  F (37.4  C) (Temporal)   Resp 16   Ht 1.753 m (5' 9.02\")   Wt 66.2 kg (145 lb 14.4 " oz)   SpO2 99%   BMI 21.54 kg/m    Body mass index is 21.54 kg/m .  Physical Exam   GENERAL: alert and no distress  Wearing a brace. Looks well today.    No results found for any visits on 09/24/24.        Signed Electronically by: Nakia Reyes PA-C

## 2024-09-26 NOTE — CONFIDENTIAL NOTE
NEUROSURGERY - NEW PREVISIT PLANNING    Referring Provider:   Nakia Reyes PA-C      OVN 08/27/2024   Reason For Visit: S22.080D (ICD-10-CM) - Closed wedge compression fracture of T12 vertebra with routine healing, subsequent encounter        IMAGING STATUS/LOCATION DATE/TYPE   MRI N/A    CT PACS 08/17/2024  Thoracic, Lumbar, Cervical, Head, Abd/Pelvis  [facility]   XRAY *scheduled same day    NOTES STATUS/LOCATION DATE/TYPE   Other specialist OVN: Encounters / ED 08/17/2024   EMG N/A    INJECTION N/A    PHYSICAL THERAPY N/A    SURGERY N/A

## 2024-09-30 NOTE — PROGRESS NOTES
[facility] Neurosurgery  Neurosurgery Clinic Visit      CC: Back pain     Primary care Provider: Harsh Dudley    HPI: Sage Luna is a 43 year old male with past medical history DM T1, untreated osteoporosis, vitamin D deficieny recently evaluted at the [facility] for back pain found to have acute T12 compression fracture after a fall 8/17, providers recommended TLSO . Evaluated by PCP 8/27 with recommendations to endocrinology for osteoporosis reevaluation/management, provided Vitamin D replacement, and referred to NSGY clinic.     Patient reports significant improvement in back pain compared to initial hospital evaluation; currently describes back stiffness rather than pain. Currently using Tylenol as needed for pain. Denies radicular pain, paresthesias, acute bowel or bladder dysfunction, saddle anesthesia, recent fall/trauma. Has been wearing brace as directed with improvement; patient using previously owned lumbar support brace. Taking vitamin D supplement.     Per chart review, last DEXA scan was 2014 noting osteoporosis. Denies history of chronic steroid use and tobacco abuse. Not currently on osteoporotic medication.       Past Medical History:   Diagnosis Date    Diabetes (H)     Lyme arthritis (H)     Lyme disease        Past Medical History reviewed with patient during visit.    Past Surgical History:   Procedure Laterality Date    ARTHROSCOPY KNEE Right 3/25/2015    Procedure: ARTHROSCOPY KNEE;  Surgeon: Te Mcfarland MD;  Location: [facility] OR    ARTHROSCOPY KNEE WITH LATERAL MENISCECTOMY  9/4/2014    Procedure: ARTHROSCOPY KNEE WITH LATERAL MENISCECTOMY;  Surgeon: Te Mcfarland MD;  Location: [facility] OR    OPEN REDUCTION INTERNAL FIXATION FEMUR DISTAL Right 9/4/2014    Procedure: OPEN REDUCTION INTERNAL FIXATION FEMUR DISTAL;  Surgeon: Te Mcfarland MD;  Location:  OR    REMOVE HARDWARE KNEE Right 3/25/2015    Procedure: REMOVE HARDWARE KNEE;  Surgeon: Te Mcfarland MD;   Location: [facility] OR     Past Surgical History reviewed with patient during visit.    Current Outpatient Medications   Medication Sig Dispense Refill    acetaminophen (TYLENOL) 500 MG tablet Take 1,000 mg by mouth every 6 hours as needed.      aspirin 81 MG EC tablet Take 1 tablet (81 mg) by mouth daily      atorvastatin (LIPITOR) 20 MG tablet Take 1 tablet (20 mg) by mouth daily 90 tablet 3    blood glucose ([employer] TEST STRIPS) test strip Inject 100 each into the skin      calcitonin, salmon, (MIACALCIN) 200 UNIT/ACT nasal spray Spray 200 Units in nostril daily.      calcium carbonate 500 mg, elemental, ([employer] CALCIUM CARBONATE) 1250 (500 Ca) MG TABS tablet Take 500 mg by mouth daily.      Continuous Blood Gluc Sensor (FREESTYLE KATHERINE 14 DAY SENSOR) MISC 2 each every 14 days 2 each 2    famotidine (PEPCID) 20 MG tablet Take 1 tablet (20 mg) by mouth 2 times daily. 30 tablet 0    Insulin Aspart FlexPen 100 UNIT/ML SOPN ADMINISTER 16 UNITS UNDER THE SKIN THREE TIMES DAILY BEFORE MEALS 45 mL 3    insulin pen needle (B-D U/F) 31G X 5 MM miscellaneous USE 4 PEN NEEDLES DAILY OR AS DIRECTED. 400 each 0    SEMGLEE, YFGN, 100 UNIT/ML SOPN INJECT 40 UNITS SUBCUTANEOUSLY AT BEDTIME.(SUBSTITUTED FOR LANTUS) 45 mL 3    vitamin D2 (ERGOCALCIFEROL) 18776 units (1250 mcg) capsule Take 1 capsule (50,000 Units) by mouth once a week. 8 capsule 0    vitamin D3 (CHOLECALCIFEROL) 50 mcg (2000 units) tablet Take 50 mcg by mouth daily.      oxyCODONE (ROXICODONE) 5 MG tablet Take 5 mg by mouth every 6 hours as needed. (Patient not taking: Reported on 10/1/2024)       No current facility-administered medications for this visit.       No Known Allergies    Social History     Socioeconomic History    Marital status:     Number of children: 1   Tobacco Use    Smoking status: Never     Passive exposure: Never    Smokeless tobacco: Never   Vaping Use    Vaping status: Never Used   Substance and Sexual Activity    Alcohol use: Yes     Comment:  seldom    Drug use: No    Sexual activity: Yes     Partners: Female   Other Topics Concern    Parent/sibling w/ CABG, MI or angioplasty before 65F 55M? No     Social Determinants of Health     Financial Resource Strain: Low Risk  (5/2/2024)    Financial Resource Strain     Within the past 12 months, have you or your family members you live with been unable to get utilities (heat, electricity) when it was really needed?: No   Food Insecurity: No Food Insecurity (8/17/2024)    Received from [facility]    Hunger Vital Sign     Worried About Running Out of Food in the Last Year: Never true     Ran Out of Food in the Last Year: Never true   Transportation Needs: No Transportation Needs (8/17/2024)    Received from [facility]    PRAPARE - Transportation     Lack of Transportation (Medical): No     Lack of Transportation (Non-Medical): No   Physical Activity: Insufficiently Active (5/2/2024)    Exercise Vital Sign     Days of Exercise per Week: 2 days     Minutes of Exercise per Session: 30 min   Stress: Stress Concern Present (5/2/2024)    [employer] [place] [employer] - Occupational Stress Questionnaire     Feeling of Stress : To some extent   Social Connections: Unknown (5/2/2024)    Social Connection and Isolation Panel [NHANES]     Frequency of Social Gatherings with Friends and Family: Once a week   Interpersonal Safety: Low Risk  (9/24/2024)    Interpersonal Safety     Do you feel physically and emotionally safe where you currently live?: Yes     Within the past 12 months, have you been hit, slapped, kicked or otherwise physically hurt by someone?: No     Within the past 12 months, have you been humiliated or emotionally abused in other ways by your partner or ex-partner?: No   Housing Stability: Low Risk  (8/17/2024)    Received from [facility]    Housing Stability     What is your living situation today?: I have a steady place to live       Family History   Problem Relation Age of Onset     Hypertension Father     Cancer Maternal Grandmother          , colon cancer    Cancer Paternal Grandfather         leukemia, still alive    No Known Problems Mother     Hypothyroidism Sister          ROS: 10 point ROS neg other than the symptoms noted above in the HPI.    Vital Signs:   /79   Pulse 104   SpO2 97%       Examination:  Patient wearing lumbar support brace.   Constitutional:  Alert, well nourished, NAD.  Memory: recent and remote memory   HEENT: Normocephalic, atraumatic.   Pulm:  Without shortness of breath   CV:  No pitting edema of BLE.      Neurological:  Awake  Alert  Oriented x 3  Speech clear    Motor exam:   Hip Flexion:                 Right: 5/5  Left:  5/5  Knee Flexion:              Right:  5/5  Left:  5/5  Knee Extension:          Right:  5/5  Left:  5/5  Plantar Flexion:           Right:  5/5  Left:  5/5  Dorsal Flexion:            Right:  5/5  Left:  5/5  EHL:                            Right:  5/5  Left:  5/5     Sensation normal to bilateral lower extremities  Muscle tone to bilateral lower extremities   Gait: Able to stand from a seated position. Normal non-antalgic.  Nontender to palpation of thoracic and lumbar spine and paraspinous muscles.     Imaging:   Imaging Interpretation provided by radiologist.   XR THORACIC SPINE 2 VIEWS 10/1/2024 10:08 AM      COMPARISON: Thoracic spine CT 2024.     HISTORY: Closed wedge compression fracture of T12 vertebra with  routine healing, subsequent encounter.                                                     IMPRESSION: Chronic appearing mild anterior wedge compression fracture  deformity of the T11 vertebral body again noted. Subacute moderate  anterior wedge compression fracture deformity of the T12 vertebral  body again noted with probable slight further loss of height  anteriorly since comparison study. Vertebral body heights and contours  otherwise normal. No definite new fractures. The visualized bones are  osteopenic.  There is focal accentuation of kyphosis centered at  T11-T12. Alignment otherwise normal.    Assessment/Plan:   43 year old male with past medical history DM T1, untreated osteoporosis, vitamin D deficieny recently evaluted at the [facility] for back pain found to have acute T12 compression fracture after a fall 8/17, providers recommended TLSO . Evaluated by PCP 8/27 with recommendations to endocrinology for osteoporosis reevaluation/management, provided Vitamin D replacement, and referred to NSGY clinic.     Patient reports significant improvement in back pain compared to initial hospital evaluation; currently describes back stiffness rather than pain. Currently using Tylenol as needed for pain. Denies radicular pain, paresthesias, acute bowel or bladder dysfunction, saddle anesthesia, recent fall/trauma. Has been wearing brace as directed with improvement; patient using previously owned lumbar support brace. Taking vitamin D supplement.     Per chart review, last DEXA scan was 2014 noting osteoporosis. Denies history of chronic steroid use and tobacco abuse. Not currently on osteoporotic medication. Scheduled with endocrinology 10/3/24 for osteoporosis re-evaluation/management.    Patient works as a  which can include heavy lifting, patient requesting RTW letter. Hard copy of letter provided to patient today, will re-evaluate and adjust at next appointment.     Plan:  - Continue wearing brace when upright and out of bed  - Continue using Tylenol as needed  - Keep appointment with endocrinology for osteoporosis re-evaluation/management  - Continue taking Vitamin D as prescribed   - RTW letter provided today in clinic - re-evaluate and adjust restrictions at next appointment   - Limit lifting to no more than 10 pounds, limit repetitive bending/twisting/jarring motions  - Follow up with NSGY in 6 weeks with upright XR prior.      Call the clinic at [phone] for any other questions and  concerns.      Patient verbalized understanding and is in agreement with the plan.    Jesica Carr PA-C  [facility] Neurosurgery  [address] [address]

## 2024-10-01 ENCOUNTER — OFFICE VISIT (OUTPATIENT)
Dept: NEUROSURGERY | Facility: CLINIC | Age: 44
End: 2024-10-01
Attending: PHYSICIAN ASSISTANT
Payer: COMMERCIAL

## 2024-10-01 ENCOUNTER — PRE VISIT (OUTPATIENT)
Dept: NEUROSURGERY | Facility: CLINIC | Age: 44
End: 2024-10-01
Payer: COMMERCIAL

## 2024-10-01 ENCOUNTER — TRANSFERRED RECORDS (OUTPATIENT)
Dept: MULTI SPECIALTY CLINIC | Facility: CLINIC | Age: 44
End: 2024-10-01

## 2024-10-01 ENCOUNTER — ANCILLARY PROCEDURE (OUTPATIENT)
Dept: GENERAL RADIOLOGY | Facility: CLINIC | Age: 44
End: 2024-10-01
Payer: COMMERCIAL

## 2024-10-01 VITALS — HEART RATE: 104 BPM | OXYGEN SATURATION: 97 % | DIASTOLIC BLOOD PRESSURE: 79 MMHG | SYSTOLIC BLOOD PRESSURE: 112 MMHG

## 2024-10-01 DIAGNOSIS — S22.080D CLOSED WEDGE COMPRESSION FRACTURE OF T12 VERTEBRA WITH ROUTINE HEALING, SUBSEQUENT ENCOUNTER: ICD-10-CM

## 2024-10-01 DIAGNOSIS — E55.9 VITAMIN D DEFICIENCY: ICD-10-CM

## 2024-10-01 DIAGNOSIS — S22.080D CLOSED WEDGE COMPRESSION FRACTURE OF T12 VERTEBRA WITH ROUTINE HEALING, SUBSEQUENT ENCOUNTER: Primary | ICD-10-CM

## 2024-10-01 DIAGNOSIS — E10.65 TYPE 1 DIABETES MELLITUS WITH HYPERGLYCEMIA (H): ICD-10-CM

## 2024-10-01 DIAGNOSIS — M80.00XD OSTEOPOROSIS WITH CURRENT PATHOLOGICAL FRACTURE WITH ROUTINE HEALING, UNSPECIFIED OSTEOPOROSIS TYPE, SUBSEQUENT ENCOUNTER: ICD-10-CM

## 2024-10-01 LAB — RETINOPATHY: NORMAL

## 2024-10-01 PROCEDURE — 72070 X-RAY EXAM THORAC SPINE 2VWS: CPT | Mod: TC | Performed by: RADIOLOGY

## 2024-10-01 PROCEDURE — 99204 OFFICE O/P NEW MOD 45 MIN: CPT

## 2024-10-01 PROCEDURE — 99213 OFFICE O/P EST LOW 20 MIN: CPT

## 2024-10-01 ASSESSMENT — PAIN SCALES - GENERAL: PAINLEVEL: MILD PAIN (2)

## 2024-10-01 NOTE — PATIENT INSTRUCTIONS
- Continue wearing brace when up and out of bed. Okay to have off for resting/sleeping/hygiene.    - Okay to continue using Tylenol as needed.    - Keep appointment for endocrinology for osteoporosis re-evaluation/management.    - Continue taking Vitamin D as prescribed.     - Recommend limiting lifting to no  more than 10 pounds, avoid excessive bending/twisting/jarring motions.    - Walk as tolerated. Avoid falls if able.    - Okay to return to work with restrictions. RTW letter with restrictions provided today.      - Follow up with neurosurgery clinic in 6 weeks with xrays prior. Please contact clinic via [place] or telephone [phone] for questions, concerns, scheduling assistance.

## 2024-10-01 NOTE — LETTER
October 1, 2024      Sage Luna  [address] [address] [id number]        To Whom It May Concern:    Sage Luna was seen in our clinic. He may return to work 10/21/2024 with the following activity restrictions: please limit lifting to no more than 10 pounds. Limit excessive bending, twisting, jarring motions. Ambulate as tolerated. Will re-evaluate patient in clinic in several weeks and adjust activity restrictions as appropriate.       Sincerely,      Jesica Carr

## 2024-10-01 NOTE — NURSING NOTE
"Sage Luna is a 43 year old male who presents for:  Chief Complaint   Patient presents with    Consult     Closed wedge compression fracture of T12 vertebra with routine healing, stiffness        Initial Vitals:  /79   Pulse 104   SpO2 97%  Estimated body mass index is 21.54 kg/m  as calculated from the following:    Height as of 9/24/24: 5' 9.02\" (1.753 m).    Weight as of 9/24/24: 145 lb 14.4 oz (66.2 kg).. There is no height or weight on file to calculate BSA. BP completed using cuff size: regular  Mild Pain (2)    Nursing Comments:       Yun Knott    "

## 2024-10-03 ENCOUNTER — OFFICE VISIT (OUTPATIENT)
Dept: ENDOCRINOLOGY | Facility: CLINIC | Age: 44
End: 2024-10-03
Attending: PHYSICIAN ASSISTANT
Payer: COMMERCIAL

## 2024-10-03 VITALS
OXYGEN SATURATION: 99 % | HEIGHT: 69 IN | WEIGHT: 151.5 LBS | DIASTOLIC BLOOD PRESSURE: 84 MMHG | TEMPERATURE: 97.6 F | BODY MASS INDEX: 22.44 KG/M2 | HEART RATE: 108 BPM | SYSTOLIC BLOOD PRESSURE: 131 MMHG | RESPIRATION RATE: 16 BRPM

## 2024-10-03 DIAGNOSIS — M80.00XA OSTEOPOROSIS WITH CURRENT PATHOLOGICAL FRACTURE, UNSPECIFIED OSTEOPOROSIS TYPE, INITIAL ENCOUNTER: ICD-10-CM

## 2024-10-03 PROCEDURE — 36415 COLL VENOUS BLD VENIPUNCTURE: CPT | Performed by: INTERNAL MEDICINE

## 2024-10-03 PROCEDURE — 82565 ASSAY OF CREATININE: CPT | Performed by: INTERNAL MEDICINE

## 2024-10-03 PROCEDURE — 99204 OFFICE O/P NEW MOD 45 MIN: CPT | Performed by: INTERNAL MEDICINE

## 2024-10-03 PROCEDURE — 84403 ASSAY OF TOTAL TESTOSTERONE: CPT | Performed by: INTERNAL MEDICINE

## 2024-10-03 PROCEDURE — 82310 ASSAY OF CALCIUM: CPT | Performed by: INTERNAL MEDICINE

## 2024-10-03 PROCEDURE — 84270 ASSAY OF SEX HORMONE GLOBUL: CPT | Performed by: INTERNAL MEDICINE

## 2024-10-03 PROCEDURE — 82306 VITAMIN D 25 HYDROXY: CPT | Performed by: INTERNAL MEDICINE

## 2024-10-03 PROCEDURE — 83970 ASSAY OF PARATHORMONE: CPT | Performed by: INTERNAL MEDICINE

## 2024-10-03 PROCEDURE — G2211 COMPLEX E/M VISIT ADD ON: HCPCS | Performed by: INTERNAL MEDICINE

## 2024-10-03 ASSESSMENT — PAIN SCALES - GENERAL: PAINLEVEL: NO PAIN (0)

## 2024-10-03 NOTE — PATIENT INSTRUCTIONS
[facility]  Dr Eduardo, Endocrinology Department    [facility]   [address] [facility]  [place] [address]  Appointment [phone]  Fax: [phone]  [place]: Monday - Thursday      Please check the cost coverage and copay with insurance before recommended tests, services and medications (especially if new medications are prescribed).     If ordered, please get blood work done 1 week prior to your next appointment so they will be available to Dr. Eduardo at your visit.    Labs needed  DEXA needed  Follow up after above    The pt was advised to  Maintain an adequate calcium and vitamin D intake and to supplement vitamin D if needed to maintain serum levels of 25 hydroxy D (25 OH D) between 30-60 ng/ml.  Limit alcohol intake to no more than 2 servings per day.  Limit caffeine intake.  Maintain an active lifestyle including weight-bearing exercises for at least 30 mins daily.  Take measures to reduce the risk of falling.     You should get 1200 mg/day calcium in divided doses of no more than 500 mg/dose.  This INCLUDES what is in your food as well as what is in any supplements you may be taking.    Dietary sources of calcium:: These also contain vitamin D  Milk                            8 oz            300 mg calcium  Yogurt                          1 cup           400 mg calcium   Hard cheese                     1.5 oz          300 mg  Cottage cheese                  2 cup           300 mg  Orange juice with Calcium       8 oz            300 mg  Low fat dairy sources are recommended        You should get 30 minutes of moderate weight bearing exercise on most days of the week .  Weight bearing exercise includes such things as walking, jogging, hiking, dancing.  You should also get Strength training 2 or more times/week in addition to other weight -being exercise. Strength training uses weight or resistance beyond that seen in everyday activities -(jus,  weight training with free weights, weight machines or resistance bands)     Living with Osteoporosis: Preventing Fractures  If you have osteoporosis, you can do a lot to reduce its effect on your life. Knowing how to prevent fractures and spinal curvature can help you live more comfortably and safely with this disease.  Reducing your risk for fractures  The most common fracture sites in people with osteoporosis are the wrist, spine, and hip. These fractures are often caused by accidents and falls. All fractures are painful and may limit what you can do. But hip fractures are very serious. They often need surgery, and it can take months to recover. To reduce your risk for fractures:  Get regular exercise. Try walking, swimming, or weight training.  Eat foods that are rich in calcium, or take calcium supplements.  Make your home safe to help avoid accidents.  Take extra precautions not to fall in risky areas, such as icy sidewalks.  Understanding spinal fractures  Your spine is made up of many bones called vertebrae. Osteoporosis can cause the vertebrae in your spine to collapse. As a result, your upper back may arch forward, creating a curvature. Spine fractures may also result from back strain and bad posture. You will also lose height. Your lower spine must then adjust to keep your body balanced. This can cause back pain. To prevent or lessen these spinal changes:  Practice good posture.  Use proper techniques if you need to lift heavy objects.  Do back exercises to help your posture.  Lie on your back when you have pain.  Ask your healthcare provider about these and other ways to help your spine.  Marj last reviewed this educational content on 5/1/2018 2000-2021 [employer]. All rights reserved. This information is not intended as a substitute for professional medical care. Always follow your healthcare professional's instructions.          Living with Osteoporosis: Regular Exercise  If you  have osteoporosis, exercise is vital for your health. It can prevent bone fractures and spine changes. It will slow bone loss. Exercise will strengthen your body. It can also be fun. A variety of exercises is best. See below for exercises that can help you. But before you start, talk with your healthcare provider to be sure these exercises are right for you.   Resistance exercises. These build muscle strength and maintain bone mass. They also make you less prone to injury. Exercises include lifting small weights, doing push-ups and sit-ups, using elastic exercise bands, and using weight machines.   Weight-bearing activities. These help your whole body. They also help you maintain bone mass. Activities include walking, dancing, and housework.   Non-weight-bearing exercises. These help prevent back strain and pain. They do this by building the trunk and leg muscles. Exercises that help with flexibility can prevent falls. Examples include swimming, water exercise, and stretching.   Staying safe  Here are tips to stay safe:   Always check with your healthcare provider before starting any new exercise program.  Use weights only as instructed.  Stop any exercise that causes pain.  Pricing Assistant last reviewed this educational content on 5/1/2018 2000-2021 [employer]. All rights reserved. This information is not intended as a substitute for professional medical care. Always follow your healthcare professional's instructions.          Preventing Osteoporosis: Avoiding Bone Loss  Certain factors can speed up bone loss or decrease bone growth. For example, alcohol, cigarettes, and certain medicines reduce bone mass. Some foods make it hard for your body to absorb calcium.  Things to avoid  Here are things to avoid to help prevent osteoporosis:  Alcohol. This is toxic to bones. It is a major cause of bone loss. Heavy drinking can cause osteoporosis even if you have no other risk factors.  Smoking. This reduces bone  mass. Smoking may also interfere with estrogen levels and cause early menopause.  Inactivity. Not being active makes your bones lose strength and become thinner. Over time, thin bones may break. Women who aren't active are at a high risk for osteoporosis.  Certain medicines. Some medicines, such as cortisone, increase bone loss. They also decrease bone growth. Ask your healthcare provider about any side effects of your medicines, and how to prevent them.  Protein-rich or salty foods. Eaten in large amounts, these foods may deplete calcium.  Caffeine. This increases calcium loss. People who drink a lot of coffee, tea, or soda lose more calcium than those who don't.  [employer] last reviewed this educational content on 5/1/2018 2000-2021 [employer]. All rights reserved. This information is not intended as a substitute for professional medical care. Always follow your healthcare professional's instructions.          Preventing Osteoporosis: Meeting Your Calcium Needs  Your body needs calcium to build and repair bones. But it can't make calcium on its own. That's why it's important to eat calcium-rich foods. Some foods are naturally rich in calcium. Others have calcium added (fortified). It's best to get calcium from the foods you eat. But if you can't get enough, you may want to take calcium supplements. To meet your daily calcium needs, try the foods listed below.                          Dairy Fish & beans Other sources   Source   Calcium (mg) per serving   Source   Calcium (mg) per serving   Source   Calcium (mg) per serving   Low-fat yogurt, plain   415 mg/8 oz.   Sardines, [place], canned, with bones   351 mg/3 oz.   Oatmeal, instant, fortified   215 mg/1 cup   Nonfat milk   302 mg/1 cup   [place], sockeye, canned, with bones   239 mg/3 oz.   Tofu made with calcium sulfate   204 mg/3 oz.   Low-fat milk   297 mg/1 cup   Soybeans, fresh, boiled   131 mg/1/2 cup   Collards   179 mg/1/2 cup   Swiss  cheese   272 mg/1 oz.   White beans, cooked   81 mg/1/2 cup   English muffin, whole wheat   175 mg/1 muffin   Cheddar cheese   205 mg/1 oz.   Navy beans, cooked   79 mg/1/2 cup   Kale   90 mg/1/2 cup   Ice cream strawberry   79 mg/1/2 cup           Orange, navel   56 mg/1 medium   Note: Calcium levels may vary depending on brand and size.  Daily calcium needs  14 to 18 years old: 1,300 mg  19 to 30 years old: 1,000 mg  31 to 50 years old: 1,000 mg  51 to 70 years old, women: 1,200 mg  51 to 70 years old, men: 1,000 mg  Pregnant or nursin to 18 years old: 1,300 mg, 19 to 50 years old: 1,000 mg  Older than 70 (women and men): 1,200 mg   Marj last reviewed this educational content on 2018-2021 [employer]. All rights reserved. This information is not intended as a substitute for professional medical care. Always follow your healthcare professional's instructions.

## 2024-10-03 NOTE — LETTER
10/3/2024      Sage Luna  [address] [facility] [address]      Dear Colleague,    Thank you for referring your patient, Sage Luna, to the [facility]. Please see a copy of my visit note below.    Name: Sage Luna  Date: 10/3/2024  Seen in consultation with Nakia Reyes PA-C  for osteoporosis.     HPI:  Sage Luna is a 43 year old male who presents for the evaluation of osteoporosis.   has a past medical history of Diabetes (H), Lyme arthritis (H), and Lyme disease.  Type 1 DM-- managed by PCP- not discussed.    Osteoporosis:  He was seen by Dr. Wu at  before for osteoporosis.    He was diagnosed with osteoporosis in [id number].    He was admitted at [facility] 8/2024 -- acute compression fracture of T11-T12 vertebra with some older lumbar compression fractures. (Fell off a dock 8/17)-- plan for brace.-- followed by neurosurgery.  H/o femure fracture in 2015    No recent DEXA scan to review.  DEXA 2014: Z score below expected range of age.    2. Vit D deficiency: Noted to have low vitamin D levels.  Currently taking vitamin D3- 50,000 international units once a week. No follow-up vitamin D levels to review.     3. Male hypogonadism: noted low T levels in past.  He has never been on testosterone replacement.  He has never been on testosterone replacement.  He was asked to follow-up with endocrinology by PCP in 2014 to address osteoporosis or low testosterone diagnosed by primary care provider but that he never followed up with endocrinology.    GERD: yes-- he take famotidine. Sill has s/s once in a while.    Dental health: OK. No major upcoming dental procedure.  Has regular follow-up with dentistry.     Kidney function: within normal limits.     Previous treatment for osteopenia/osteoporosis: none    Current treatment for Osteopenia/Osteoporosis: none    Smoke:No  Family History:uncle  Fractures:Yes: vertebral fractures in summer 2024 (  after falling from a deck) and femur fracture in 2015.( After a fall)  Kidney stones: No  GI Surgery:No  Duration of therapy:  as noted above  Exercise: Working at [employer]- .  Diet:  2 servings of dairy/day  Ca/Vitamin D: No calcium supplement. Takes vit 50,000 international unit(s)/week  Alcohol:  rare  Eating Disorder: No  Steroid Use:  No  PMH/PSH:  Past Medical History:   Diagnosis Date     Diabetes (H)      Lyme arthritis (H)      Lyme disease      Past Surgical History:   Procedure Laterality Date     ARTHROSCOPY KNEE Right 3/25/2015    Procedure: ARTHROSCOPY KNEE;  Surgeon: Te Mcfarland MD;  Location: [facility] OR     ARTHROSCOPY KNEE WITH LATERAL MENISCECTOMY  2014    Procedure: ARTHROSCOPY KNEE WITH LATERAL MENISCECTOMY;  Surgeon: Te Mcfarland MD;  Location: [facility] OR     OPEN REDUCTION INTERNAL FIXATION FEMUR DISTAL Right 2014    Procedure: OPEN REDUCTION INTERNAL FIXATION FEMUR DISTAL;  Surgeon: Te Mcfarland MD;  Location: [facility] OR     REMOVE HARDWARE KNEE Right 3/25/2015    Procedure: REMOVE HARDWARE KNEE;  Surgeon: Te Mcfarland MD;  Location: [facility] OR     Family Hx:  Family History   Problem Relation Age of Onset     Hypertension Father      Cancer Maternal Grandmother          , colon cancer     Cancer Paternal Grandfather         leukemia, still alive     No Known Problems Mother      Hypothyroidism Sister               Social Hx:  Social History     Socioeconomic History     Marital status:      Spouse name: Not on file     Number of children: 1     Years of education: Not on file     Highest education level: Not on file   Occupational History     Not on file   Tobacco Use     Smoking status: Never     Passive exposure: Never     Smokeless tobacco: Never   Vaping Use     Vaping status: Never Used   Substance and Sexual Activity     Alcohol use: Yes     Comment: seldom     Drug use: No     Sexual activity: Yes     Partners: Female   Other Topics Concern      Parent/sibling w/ CABG, MI or angioplasty before 65F 55M? No   Social History Narrative     Not on file     Social Determinants of Health     Financial Resource Strain: Low Risk  (5/2/2024)    Financial Resource Strain      Within the past 12 months, have you or your family members you live with been unable to get utilities (heat, electricity) when it was really needed?: No   Food Insecurity: No Food Insecurity (8/17/2024)    Received from [facility]    Hunger Vital Sign      Worried About Running Out of Food in the Last Year: Never true      Ran Out of Food in the Last Year: Never true   Transportation Needs: No Transportation Needs (8/17/2024)    Received from [facility]    PRAPARE - Transportation      Lack of Transportation (Medical): No      Lack of Transportation (Non-Medical): No   Physical Activity: Insufficiently Active (5/2/2024)    Exercise Vital Sign      Days of Exercise per Week: 2 days      Minutes of Exercise per Session: 30 min   Stress: Stress Concern Present (5/2/2024)    Citizen of [place] of Occupational Health - Occupational Stress Questionnaire      Feeling of Stress : To some extent   Social Connections: Unknown (5/2/2024)    Social Connection and Isolation Panel [NHANES]      Frequency of Communication with Friends and Family: Not on file      Frequency of Social Gatherings with Friends and Family: Once a week      Attends Oriental orthodox Services: Not on file      Active Member of Clubs or Organizations: Not on file      Attends Club or Organization Meetings: Not on file      Marital Status: Not on file   Interpersonal Safety: Low Risk  (9/24/2024)    Interpersonal Safety      Do you feel physically and emotionally safe where you currently live?: Yes      Within the past 12 months, have you been hit, slapped, kicked or otherwise physically hurt by someone?: No      Within the past 12 months, have you been humiliated or emotionally abused in other ways by your partner or ex-partner?: No    Housing Stability: Low Risk  (8/17/2024)    Received from [facility]    Housing Stability      What is your living situation today?: I have a steady place to live          MEDICATIONS:  has a current medication list which includes the following prescription(s): acetaminophen, aspirin, atorvastatin, blood glucose, calcitonin (salmon), calcium carbonate 500 mg (elemental), freestyle elizabeth 14 day sensor, famotidine, insulin aspart flexpen, b-d u/f, semglee (yfgn), vitamin d2, and vitamin d3.    ROS     ROS: 10 point ROS neg other than the symptoms noted above in the HPI.    Physical Exam   VS: There were no vitals taken for this visit.  GENERAL: healthy, alert and no distress  EYES: Eyes grossly normal to inspection, conjunctivae and sclerae normal  ENT: no nose swelling, nasal discharge.  Thyroid: no apparent thyroid nodules.    CV: RRR, no rubs, gallops, no murmurs  RESP: CTAB, no wheezes, rales, or ronchi  ABDO: +BS  EXTREMITIES: no hand tremors.  NEURO: Cranial nerves grossly intact, mentation intact and speech normal  SPINE: Midline, no TTP.  SKIN: No apparent skin lesions, rash or edema seen   PSYCH: mentation appears normal, affect normal/bright, judgement and insight intact, normal speech and appearance well-groomed    LABS:  Calcium:   Latest Ref Rng 11/3/2023  2:09 PM   ENDO CALCIUM LABS-[facility]     Calcium 8.6 - 10.0 mg/dL 8.8        Creatinine:  Creatinine   Date Value Ref Range Status   11/03/2023 0.98 0.67 - 1.17 mg/dL Final   12/15/2020 0.84 0.66 - 1.25 mg/dL Final       TFTs:  Lab Results   Component Value Date    TSH 1.73 05/03/2024    TSH 1.01 09/26/2019       PTH:    Vitamin D:  Vitamin D Deficiency Screening Results:  Lab Results   Component Value Date    VITDT 8 (L) 05/03/2024    VITDT 13 (L) 09/05/2014         DEXA:      All pertinent notes, labs, and images personally reviewed by me.     A/P  Mr.Aaron MABLE Luna is a 43 year old here for the evaluation of:    Osteoporosis:  Comment: History of  vertebral fracture in summer 2024  History of femur fracture in 2014.  No recent DEXA scan to review  He has never been on medication for osteoporosis before.  Plan:   Discussed diagnosis, pathophysiology, management and treatment options of condition with pt.  Discussed osteoporosis in general.  Plan to screen for secondary causes as noted below.  Recommend to get DEXA scan.  Follow-up after that to discuss results and consider medication.    Vitamin D deficiency:   Currently taking vitamin D3--50,000 international units once a week.  Plan:  Recheck vitamin D levels.    Male hypogonadism:  Noted to have low testosterone levels in the past.  He has never been on testosterone replacement.  Plan to recheck testosterone labs  Consider further workup and possible testosterone replacement based on labs.    Plan: Creatinine, Calcium, Vitamin D Deficiency,         Parathyroid Hormone Intact, Testosterone Free         and Total, DX Bone Density            Risk factors for low bone density include personal history of fracture or family history, , advanced age, female, dementia, and poor health.  Also smoking, low BMI, Estrogen deficiency, low Ca intake, and alcoholism.  A prior history of vertebral fracture greatly increases the risk of subsequent fractures. A history of other medical diseases impacting on bone may also affect bone health.      The pt was advised to  Maintain an adequate calcium and vitamin D intake and to supplement vitamin D if needed to maintain serum levels of 25 hydroxy D (25 OH D) between 30-60 ng/ml.  Limit alcohol intake to no more than 2 servings per day.  Limit caffeine intake.  Maintain an active lifestyle including weight-bearing exercises for at least 30 mins daily.  Take measures to reduce the risk of falling.   Discussed indications, risks and benefits of all medications prescribed, and answered questions to patient's satisfaction.  The longitudinal plan of care for the  diagnosis(es)/condition(s) as documented were addressed during this visit. Due to the added complexity in care, I will continue to support Sage in the subsequent management and with ongoing continuity of care.  All questions were answered.  The patient indicates understanding of the above issues and agrees with the plan set forth.    Follow-up:  As noted in AVS    Rosalind Eduardo MD  Endocrinology  [facility]  CC: Harsh Dudley      Again, thank you for allowing me to participate in the care of your patient.        Sincerely,        Rosalind Eduardo MD

## 2024-10-03 NOTE — PROGRESS NOTES
Name: Sage Luna  Date: 10/3/2024  Seen in consultation with Nakia Reyes PA-C  for osteoporosis.     HPI:  Sage Luna is a 43 year old male who presents for the evaluation of osteoporosis.   has a past medical history of Diabetes (H), Lyme arthritis (H), and Lyme disease.  Type 1 DM-- managed by PCP- not discussed.    Osteoporosis:  He was seen by Dr. Wu at  before for osteoporosis.    He was diagnosed with osteoporosis in [id number].    He was admitted at [facility] 8/2024 -- acute compression fracture of T11-T12 vertebra with some older lumbar compression fractures. (Fell off a dock 8/17)-- plan for brace.-- followed by neurosurgery.  H/o femure fracture in 2015    No recent DEXA scan to review.  DEXA 2014: Z score below expected range of age.    2. Vit D deficiency: Noted to have low vitamin D levels.  Currently taking vitamin D3- 50,000 international units once a week. No follow-up vitamin D levels to review.     3. Male hypogonadism: noted low T levels in past.  He has never been on testosterone replacement.  He has never been on testosterone replacement.  He was asked to follow-up with endocrinology by PCP in 2014 to address osteoporosis or low testosterone diagnosed by primary care provider but that he never followed up with endocrinology.    GERD: yes-- he take famotidine. Sill has s/s once in a while.    Dental health: OK. No major upcoming dental procedure.  Has regular follow-up with dentistry.     Kidney function: within normal limits.     Previous treatment for osteopenia/osteoporosis: none    Current treatment for Osteopenia/Osteoporosis: none    Smoke:No  Family History:uncle  Fractures:Yes: vertebral fractures in summer 2024 ( after falling from a deck) and femur fracture in 2015.( After a fall)  Kidney stones: No  GI Surgery:No  Duration of therapy:  as noted above  Exercise: Working at [employer]- .  Diet:  2 servings of dairy/day  Ca/Vitamin D: No  calcium supplement. Takes vit 50,000 international unit(s)/week  Alcohol:  rare  Eating Disorder: No  Steroid Use:  No  PMH/PSH:  Past Medical History:   Diagnosis Date    Diabetes (H)     Lyme arthritis (H)     Lyme disease      Past Surgical History:   Procedure Laterality Date    ARTHROSCOPY KNEE Right 3/25/2015    Procedure: ARTHROSCOPY KNEE;  Surgeon: Te Mcfarland MD;  Location: [facility] OR    ARTHROSCOPY KNEE WITH LATERAL MENISCECTOMY  2014    Procedure: ARTHROSCOPY KNEE WITH LATERAL MENISCECTOMY;  Surgeon: Te Mcfarland MD;  Location: [facility] OR    OPEN REDUCTION INTERNAL FIXATION FEMUR DISTAL Right 2014    Procedure: OPEN REDUCTION INTERNAL FIXATION FEMUR DISTAL;  Surgeon: Te Mcfarland MD;  Location: [facility] OR    REMOVE HARDWARE KNEE Right 3/25/2015    Procedure: REMOVE HARDWARE KNEE;  Surgeon: Te Mcfarland MD;  Location: [facility] OR     Family Hx:  Family History   Problem Relation Age of Onset    Hypertension Father     Cancer Maternal Grandmother          , colon cancer    Cancer Paternal Grandfather         leukemia, still alive    No Known Problems Mother     Hypothyroidism Sister               Social Hx:  Social History     Socioeconomic History    Marital status:      Spouse name: Not on file    Number of children: 1    Years of education: Not on file    Highest education level: Not on file   Occupational History    Not on file   Tobacco Use    Smoking status: Never     Passive exposure: Never    Smokeless tobacco: Never   Vaping Use    Vaping status: Never Used   Substance and Sexual Activity    Alcohol use: Yes     Comment: seldom    Drug use: No    Sexual activity: Yes     Partners: Female   Other Topics Concern    Parent/sibling w/ CABG, MI or angioplasty before 65F 55M? No   Social History Narrative    Not on file     Social Determinants of Health     Financial Resource Strain: Low Risk  (2024)    Financial Resource Strain     Within the past 12 months, have you or  your family members you live with been unable to get utilities (heat, electricity) when it was really needed?: No   Food Insecurity: No Food Insecurity (8/17/2024)    Received from [facility]    Hunger Vital Sign     Worried About Running Out of Food in the Last Year: Never true     Ran Out of Food in the Last Year: Never true   Transportation Needs: No Transportation Needs (8/17/2024)    Received from [facility]    PRAPARE - Transportation     Lack of Transportation (Medical): No     Lack of Transportation (Non-Medical): No   Physical Activity: Insufficiently Active (5/2/2024)    Exercise Vital Sign     Days of Exercise per Week: 2 days     Minutes of Exercise per Session: 30 min   Stress: Stress Concern Present (5/2/2024)    [employer] - Occupational Stress Questionnaire     Feeling of Stress : To some extent   Social Connections: Unknown (5/2/2024)    Social Connection and Isolation Panel [NHANES]     Frequency of Communication with Friends and Family: Not on file     Frequency of Social Gatherings with Friends and Family: Once a week     Attends Hinduism Services: Not on file     Active Member of Clubs or Organizations: Not on file     Attends Club or Organization Meetings: Not on file     Marital Status: Not on file   Interpersonal Safety: Low Risk  (9/24/2024)    Interpersonal Safety     Do you feel physically and emotionally safe where you currently live?: Yes     Within the past 12 months, have you been hit, slapped, kicked or otherwise physically hurt by someone?: No     Within the past 12 months, have you been humiliated or emotionally abused in other ways by your partner or ex-partner?: No   Housing Stability: Low Risk  (8/17/2024)    Received from [facility]    Housing Stability     What is your living situation today?: I have a steady place to live          MEDICATIONS:  has a current medication list which includes the following prescription(s): acetaminophen, aspirin,  atorvastatin, blood glucose, calcitonin (salmon), calcium carbonate 500 mg (elemental), freestyle elizabeth 14 day sensor, famotidine, insulin aspart flexpen, b-d u/f, semglee (yfgn), vitamin d2, and vitamin d3.    ROS     ROS: 10 point ROS neg other than the symptoms noted above in the HPI.    Physical Exam   VS: There were no vitals taken for this visit.  GENERAL: healthy, alert and no distress  EYES: Eyes grossly normal to inspection, conjunctivae and sclerae normal  ENT: no nose swelling, nasal discharge.  Thyroid: no apparent thyroid nodules.    CV: RRR, no rubs, gallops, no murmurs  RESP: CTAB, no wheezes, rales, or ronchi  ABDO: +BS  EXTREMITIES: no hand tremors.  NEURO: Cranial nerves grossly intact, mentation intact and speech normal  SPINE: Midline, no TTP.  SKIN: No apparent skin lesions, rash or edema seen   PSYCH: mentation appears normal, affect normal/bright, judgement and insight intact, normal speech and appearance well-groomed    LABS:  Calcium:   Latest Ref Rng 11/3/2023  2:09 PM   END[facility] CALCIUM [facility]     Calcium 8.6 - 10.0 mg/dL 8.8        Creatinine:  Creatinine   Date Value Ref Range Status   11/03/2023 0.98 0.67 - 1.17 mg/dL Final   12/15/2020 0.84 0.66 - 1.25 mg/dL Final       TFTs:  Lab Results   Component Value Date    TSH 1.73 05/03/2024    TSH 1.01 09/26/2019       PTH:    Vitamin D:  Vitamin D Deficiency Screening Results:  Lab Results   Component Value Date    VITDT 8 (L) 05/03/2024    VITDT 13 (L) 09/05/2014         DEXA:      All pertinent notes, labs, and images personally reviewed by me.     A/P  Mr.Aaron MABLE Luna is a 43 year old here for the evaluation of:    Osteoporosis:  Comment: History of vertebral fracture in summer 2024  History of femur fracture in 2014.  No recent DEXA scan to review  He has never been on medication for osteoporosis before.  Plan:   Discussed diagnosis, pathophysiology, management and treatment options of condition with pt.  Discussed osteoporosis in  general.  Plan to screen for secondary causes as noted below.  Recommend to get DEXA scan.  Follow-up after that to discuss results and consider medication.    Vitamin D deficiency:   Currently taking vitamin D3--50,000 international units once a week.  Plan:  Recheck vitamin D levels.    Male hypogonadism:  Noted to have low testosterone levels in the past.  He has never been on testosterone replacement.  Plan to recheck testosterone labs  Consider further workup and possible testosterone replacement based on labs.    Plan: Creatinine, Calcium, Vitamin D Deficiency,         Parathyroid Hormone Intact, Testosterone Free         and Total, DX Bone Density            Risk factors for low bone density include personal history of fracture or family history, , advanced age, female, dementia, and poor health.  Also smoking, low BMI, Estrogen deficiency, low Ca intake, and alcoholism.  A prior history of vertebral fracture greatly increases the risk of subsequent fractures. A history of other medical diseases impacting on bone may also affect bone health.      The pt was advised to  Maintain an adequate calcium and vitamin D intake and to supplement vitamin D if needed to maintain serum levels of 25 hydroxy D (25 OH D) between 30-60 ng/ml.  Limit alcohol intake to no more than 2 servings per day.  Limit caffeine intake.  Maintain an active lifestyle including weight-bearing exercises for at least 30 mins daily.  Take measures to reduce the risk of falling.   Discussed indications, risks and benefits of all medications prescribed, and answered questions to patient's satisfaction.  The longitudinal plan of care for the diagnosis(es)/condition(s) as documented were addressed during this visit. Due to the added complexity in care, I will continue to support Sage in the subsequent management and with ongoing continuity of care.  All questions were answered.  The patient indicates understanding of the above issues and  agrees with the plan set forth.    Follow-up:  As noted in AVS    Rosalind Eduardo MD  Endocrinology  [facility]  CC: Harsh Dudley

## 2024-10-04 LAB
CALCIUM SERPL-MCNC: 9.2 MG/DL (ref 8.8–10.4)
CREAT SERPL-MCNC: 0.8 MG/DL (ref 0.67–1.17)
EGFRCR SERPLBLD CKD-EPI 2021: >90 ML/MIN/1.73M2
PTH-INTACT SERPL-MCNC: 22 PG/ML (ref 15–65)
SHBG SERPL-SCNC: 37 NMOL/L (ref 11–80)
VIT D+METAB SERPL-MCNC: 45 NG/ML (ref 20–50)

## 2024-10-06 LAB
TESTOST FREE SERPL-MCNC: 5.71 NG/DL
TESTOST SERPL-MCNC: 310 NG/DL (ref 240–950)

## 2024-10-21 DIAGNOSIS — K21.9 GASTROESOPHAGEAL REFLUX DISEASE WITHOUT ESOPHAGITIS: ICD-10-CM

## 2024-10-22 RX ORDER — FAMOTIDINE 20 MG/1
20 TABLET, FILM COATED ORAL 2 TIMES DAILY
Qty: 30 TABLET | Refills: 2 | Status: SHIPPED | OUTPATIENT
Start: 2024-10-22

## 2024-11-10 ENCOUNTER — HEALTH MAINTENANCE LETTER (OUTPATIENT)
Age: 44
End: 2024-11-10

## 2024-11-25 ENCOUNTER — ANCILLARY PROCEDURE (OUTPATIENT)
Dept: GENERAL RADIOLOGY | Facility: CLINIC | Age: 44
End: 2024-11-25
Payer: COMMERCIAL

## 2024-11-25 DIAGNOSIS — S22.080D CLOSED WEDGE COMPRESSION FRACTURE OF T12 VERTEBRA WITH ROUTINE HEALING, SUBSEQUENT ENCOUNTER: ICD-10-CM

## 2024-11-25 PROCEDURE — 72070 X-RAY EXAM THORAC SPINE 2VWS: CPT | Mod: TC | Performed by: RADIOLOGY

## 2025-01-01 DIAGNOSIS — E10.65 TYPE 1 DIABETES MELLITUS WITH HYPERGLYCEMIA (H): ICD-10-CM

## 2025-01-02 RX ORDER — FLURBIPROFEN SODIUM 0.3 MG/ML
SOLUTION/ DROPS OPHTHALMIC
Qty: 400 EACH | Refills: 1 | Status: SHIPPED | OUTPATIENT
Start: 2025-01-02

## 2025-02-07 PROBLEM — L97.922 ULCER OF LEFT LOWER EXTREMITY WITH FAT LAYER EXPOSED (H): Status: RESOLVED | Noted: 2019-10-02 | Resolved: 2025-02-07

## 2025-02-07 PROBLEM — S72.309A CLOSED FRACTURE OF SHAFT OF FEMUR (H): Status: RESOLVED | Noted: 2019-10-02 | Resolved: 2025-02-07

## 2025-02-07 PROBLEM — M25.469 EFFUSION OF LOWER LEG JOINT: Status: RESOLVED | Noted: 2019-10-02 | Resolved: 2025-02-07

## 2025-02-10 ENCOUNTER — PATIENT OUTREACH (OUTPATIENT)
Dept: CARE COORDINATION | Facility: CLINIC | Age: 45
End: 2025-02-10
Payer: COMMERCIAL

## 2025-02-18 DIAGNOSIS — E10.65 TYPE 1 DIABETES MELLITUS WITH HYPERGLYCEMIA (H): ICD-10-CM

## 2025-02-19 RX ORDER — INSULIN GLARGINE-YFGN 100 [IU]/ML
INJECTION, SOLUTION SUBCUTANEOUS
Qty: 45 ML | Refills: 3 | Status: SHIPPED | OUTPATIENT
Start: 2025-02-19

## 2025-05-02 ENCOUNTER — OFFICE VISIT (OUTPATIENT)
Dept: PEDIATRICS | Facility: CLINIC | Age: 45
End: 2025-05-02
Payer: COMMERCIAL

## 2025-05-02 VITALS
BODY MASS INDEX: 21.35 KG/M2 | WEIGHT: 136 LBS | DIASTOLIC BLOOD PRESSURE: 79 MMHG | OXYGEN SATURATION: 100 % | TEMPERATURE: 98 F | SYSTOLIC BLOOD PRESSURE: 116 MMHG | HEIGHT: 67 IN | HEART RATE: 106 BPM | RESPIRATION RATE: 16 BRPM

## 2025-05-02 DIAGNOSIS — R11.2 NAUSEA AND VOMITING, UNSPECIFIED VOMITING TYPE: Primary | ICD-10-CM

## 2025-05-02 DIAGNOSIS — E10.65 TYPE 1 DIABETES MELLITUS WITH HYPERGLYCEMIA (H): ICD-10-CM

## 2025-05-02 LAB
ANION GAP SERPL CALCULATED.3IONS-SCNC: 7 MMOL/L (ref 3–14)
BASOPHILS # BLD AUTO: 0 10E3/UL (ref 0–0.2)
BASOPHILS NFR BLD AUTO: 0 %
BUN SERPL-MCNC: 18 MG/DL (ref 7–30)
CALCIUM SERPL-MCNC: 9.3 MG/DL (ref 8.5–10.1)
CHLORIDE BLD-SCNC: 95 MMOL/L (ref 94–109)
CO2 SERPL-SCNC: 32 MMOL/L (ref 20–32)
CREAT SERPL-MCNC: 1 MG/DL (ref 0.66–1.25)
EGFRCR SERPLBLD CKD-EPI 2021: >90 ML/MIN/1.73M2
EOSINOPHIL # BLD AUTO: 0.1 10E3/UL (ref 0–0.7)
EOSINOPHIL NFR BLD AUTO: 1 %
ERYTHROCYTE [DISTWIDTH] IN BLOOD BY AUTOMATED COUNT: 11.9 % (ref 10–15)
GLUCOSE BLD-MCNC: 197 MG/DL (ref 70–99)
HCT VFR BLD AUTO: 44.8 % (ref 40–53)
HGB BLD-MCNC: 15.8 G/DL (ref 13.3–17.7)
IMM GRANULOCYTES # BLD: 0 10E3/UL
IMM GRANULOCYTES NFR BLD: 0 %
LYMPHOCYTES # BLD AUTO: 2.6 10E3/UL (ref 0.8–5.3)
LYMPHOCYTES NFR BLD AUTO: 31 %
MCH RBC QN AUTO: 29.6 PG (ref 26.5–33)
MCHC RBC AUTO-ENTMCNC: 35.3 G/DL (ref 31.5–36.5)
MCV RBC AUTO: 84 FL (ref 78–100)
MONOCYTES # BLD AUTO: 0.6 10E3/UL (ref 0–1.3)
MONOCYTES NFR BLD AUTO: 7 %
NEUTROPHILS # BLD AUTO: 4.9 10E3/UL (ref 1.6–8.3)
NEUTROPHILS NFR BLD AUTO: 60 %
PLATELET # BLD AUTO: 285 10E3/UL (ref 150–450)
POTASSIUM BLD-SCNC: 3.6 MMOL/L (ref 3.4–5.3)
RBC # BLD AUTO: 5.34 10E6/UL (ref 4.4–5.9)
SODIUM SERPL-SCNC: 134 MMOL/L (ref 135–145)
WBC # BLD AUTO: 8.2 10E3/UL (ref 4–11)

## 2025-05-02 PROCEDURE — 1126F AMNT PAIN NOTED NONE PRSNT: CPT | Performed by: PHYSICIAN ASSISTANT

## 2025-05-02 PROCEDURE — 3078F DIAST BP <80 MM HG: CPT | Performed by: PHYSICIAN ASSISTANT

## 2025-05-02 PROCEDURE — 36415 COLL VENOUS BLD VENIPUNCTURE: CPT | Performed by: PHYSICIAN ASSISTANT

## 2025-05-02 PROCEDURE — 3074F SYST BP LT 130 MM HG: CPT | Performed by: PHYSICIAN ASSISTANT

## 2025-05-02 PROCEDURE — 80048 BASIC METABOLIC PNL TOTAL CA: CPT | Performed by: PHYSICIAN ASSISTANT

## 2025-05-02 PROCEDURE — 99214 OFFICE O/P EST MOD 30 MIN: CPT | Performed by: PHYSICIAN ASSISTANT

## 2025-05-02 PROCEDURE — 85025 COMPLETE CBC W/AUTO DIFF WBC: CPT | Performed by: PHYSICIAN ASSISTANT

## 2025-05-02 ASSESSMENT — PAIN SCALES - GENERAL: PAINLEVEL_OUTOF10: NO PAIN (0)

## 2025-05-02 NOTE — PROGRESS NOTES
Assessment & Plan     Nausea and vomiting, unspecified vomiting type  Continue with fluids (with electrolytes)  - CBC with platelets and differential; Future  - Basic metabolic panel  (Ca, Cl, CO2, Creat, Gluc, K, Na, BUN); Future  - CBC with platelets and differential  - Basic metabolic panel  (Ca, Cl, CO2, Creat, Gluc, K, Na, BUN)    Type 1 diabetes mellitus with hyperglycemia (H)  BS stabilizing.      Work excuse given.    Subjective   Sage is a 44 year old, presenting for the following health issues:  Chills        5/2/2025    12:52 PM   Additional Questions   Roomed by S   Accompanied by self         5/2/2025   Forms   Any forms needing to be completed Yes    No       Multiple values from one day are sorted in reverse-chronological order     History of Present Illness       Reason for visit:  Recent illness  Symptom onset:  3-7 days ago  Symptoms include:  Vomit, chills  Symptom intensity:  Mild  Symptom progression:  Improving  Had these symptoms before:  Yes  Has tried/received treatment for these symptoms:  Yes  Previous treatment was successful:  Yes  Prior treatment description:  Tylenol  What makes it worse:  Na  What makes it better:  Resting and hydration   He is taking medications regularly.      Acute Illness  Acute illness concerns:   Onset/Duration: about 6 days ago   Symptoms:  Fever: No  Chills/Sweats: YES- chills  Headache (location?): No  Sinus Pressure: No  Conjunctivitis:  No  Ear Pain: no  Rhinorrhea: No  Congestion: No  Sore Throat: No  Cough: no  Wheeze: No  Decreased Appetite: yes but started to get better   Nausea: YES  Vomiting: YES  Diarrhea: No  Dysuria/Freq.: No  Dysuria or Hematuria: No  Fatigue/Achiness: YES- stomach and chest   Sick/Strep Exposure: No  Therapies tried and outcome: tylenol, hydrating, rest.     Appetite increasing  Blood sugars are starting to stabilize.   Son was sick and patient works at the [employer]    Patient is requesting a letter to work excusing him from  "work. He missed all week last wee and plans to return to work on Monday.       Review of Systems  Constitutional, HEENT, cardiovascular, pulmonary, gi and gu systems are negative, except as otherwise noted.      Objective    /79 (BP Location: Right arm, Patient Position: Sitting, Cuff Size: Adult Regular)   Pulse 106   Temp 98  F (36.7  C) (Temporal)   Resp 16   Ht 1.702 m (5' 7\")   Wt 61.7 kg (136 lb)   SpO2 100%   BMI 21.30 kg/m    Body mass index is 21.3 kg/m .  Physical Exam   GENERAL: alert and no distress  EYES: Eyes grossly normal to inspection, PERRL and conjunctivae and sclerae normal  HENT: ear canals and TM's normal, nose and mouth-moist mucous membranes- without ulcers or lesions  NECK: no adenopathy  RESP: lungs clear to auscultation - no rales, rhonchi or wheezes  CV: regular rate and rhythm, normal S1 S2, no S3 or S4  Abd: mildly tender throughout    Results for orders placed or performed in visit on 05/02/25   Basic metabolic panel  (Ca, Cl, CO2, Creat, Gluc, K, Na, BUN)     Status: Abnormal   Result Value Ref Range    Sodium 134 (L) 135 - 145 mmol/L    Potassium 3.6 3.4 - 5.3 mmol/L    Chloride 95 94 - 109 mmol/L    Carbon Dioxide (CO2) 32 20 - 32 mmol/L    Anion Gap 7 3 - 14 mmol/L    Urea Nitrogen 18 7 - 30 mg/dL    Creatinine 1.00 0.66 - 1.25 mg/dL    GFR Estimate >90 >60 mL/min/1.73m2    Calcium 9.3 8.5 - 10.1 mg/dL    Glucose 197 (H) 70 - 99 mg/dL   CBC with platelets and differential     Status: None   Result Value Ref Range    WBC Count 8.2 4.0 - 11.0 10e3/uL    RBC Count 5.34 4.40 - 5.90 10e6/uL    Hemoglobin 15.8 13.3 - 17.7 g/dL    Hematocrit 44.8 40.0 - 53.0 %    MCV 84 78 - 100 fL    MCH 29.6 26.5 - 33.0 pg    MCHC 35.3 31.5 - 36.5 g/dL    RDW 11.9 10.0 - 15.0 %    Platelet Count 285 150 - 450 10e3/uL    % Neutrophils 60 %    % Lymphocytes 31 %    % Monocytes 7 %    % Eosinophils 1 %    % Basophils 0 %    % Immature Granulocytes 0 %    Absolute Neutrophils 4.9 1.6 - 8.3 " 10e3/uL    Absolute Lymphocytes 2.6 0.8 - 5.3 10e3/uL    Absolute Monocytes 0.6 0.0 - 1.3 10e3/uL    Absolute Eosinophils 0.1 0.0 - 0.7 10e3/uL    Absolute Basophils 0.0 0.0 - 0.2 10e3/uL    Absolute Immature Granulocytes 0.0 <=0.4 10e3/uL   CBC with platelets and differential     Status: None    Narrative    The following orders were created for panel order CBC with platelets and differential.  Procedure                               Abnormality         Status                     ---------                               -----------         ------                     CBC with platelets and ...[[id number]]                      Final result                 Please view results for these tests on the individual orders.           Signed Electronically by: Nakia Reyes PA-C

## 2025-05-02 NOTE — LETTER
2025    Sage Luna   1980        To Whom it May Concern;    Please excuse Sage Luna from work - due to illness.       Sincerely,        Nakia Reyes PA-C

## 2025-08-23 ENCOUNTER — HEALTH MAINTENANCE LETTER (OUTPATIENT)
Age: 45
End: 2025-08-23

## 2025-08-25 DIAGNOSIS — E10.65 TYPE 1 DIABETES MELLITUS WITH HYPERGLYCEMIA (H): ICD-10-CM

## 2025-08-26 RX ORDER — PEN NEEDLE, DIABETIC 31 GX5/16"
4 NEEDLE, DISPOSABLE MISCELLANEOUS DAILY
Qty: 400 EACH | Refills: 0 | Status: SHIPPED | OUTPATIENT
Start: 2025-08-26